# Patient Record
Sex: FEMALE | Race: WHITE | NOT HISPANIC OR LATINO
[De-identification: names, ages, dates, MRNs, and addresses within clinical notes are randomized per-mention and may not be internally consistent; named-entity substitution may affect disease eponyms.]

---

## 2017-02-08 ENCOUNTER — APPOINTMENT (OUTPATIENT)
Dept: RADIATION ONCOLOGY | Facility: CLINIC | Age: 82
End: 2017-02-08

## 2017-02-08 VITALS
BODY MASS INDEX: 26.94 KG/M2 | RESPIRATION RATE: 16 BRPM | OXYGEN SATURATION: 96 % | DIASTOLIC BLOOD PRESSURE: 74 MMHG | HEART RATE: 73 BPM | SYSTOLIC BLOOD PRESSURE: 119 MMHG | WEIGHT: 147.27 LBS

## 2017-02-08 DIAGNOSIS — Z00.00 ENCOUNTER FOR GENERAL ADULT MEDICAL EXAMINATION W/OUT ABNORMAL FINDINGS: ICD-10-CM

## 2017-08-10 ENCOUNTER — APPOINTMENT (OUTPATIENT)
Dept: RADIATION ONCOLOGY | Facility: CLINIC | Age: 82
End: 2017-08-10

## 2017-10-18 ENCOUNTER — APPOINTMENT (OUTPATIENT)
Dept: RADIATION ONCOLOGY | Facility: CLINIC | Age: 82
End: 2017-10-18
Payer: MEDICARE

## 2017-10-18 VITALS
WEIGHT: 158.01 LBS | OXYGEN SATURATION: 95 % | SYSTOLIC BLOOD PRESSURE: 130 MMHG | RESPIRATION RATE: 16 BRPM | DIASTOLIC BLOOD PRESSURE: 78 MMHG | BODY MASS INDEX: 28.9 KG/M2 | HEART RATE: 70 BPM

## 2017-10-18 DIAGNOSIS — G89.29 OTHER CHRONIC PAIN: ICD-10-CM

## 2017-10-18 PROCEDURE — 99213 OFFICE O/P EST LOW 20 MIN: CPT

## 2017-12-15 ENCOUNTER — APPOINTMENT (OUTPATIENT)
Dept: ORTHOPEDIC SURGERY | Facility: CLINIC | Age: 82
End: 2017-12-15

## 2018-06-26 ENCOUNTER — INPATIENT (INPATIENT)
Facility: HOSPITAL | Age: 83
LOS: 5 days | Discharge: HOME | DRG: 190 | End: 2018-07-02
Attending: HOSPITALIST | Admitting: HOSPITALIST
Payer: COMMERCIAL

## 2018-06-26 VITALS
RESPIRATION RATE: 20 BRPM | TEMPERATURE: 98 F | HEART RATE: 69 BPM | OXYGEN SATURATION: 89 % | DIASTOLIC BLOOD PRESSURE: 68 MMHG | SYSTOLIC BLOOD PRESSURE: 133 MMHG

## 2018-06-26 DIAGNOSIS — I44.7 LEFT BUNDLE-BRANCH BLOCK, UNSPECIFIED: ICD-10-CM

## 2018-06-26 DIAGNOSIS — Z29.9 ENCOUNTER FOR PROPHYLACTIC MEASURES, UNSPECIFIED: ICD-10-CM

## 2018-06-26 DIAGNOSIS — M54.5 LOW BACK PAIN: ICD-10-CM

## 2018-06-26 DIAGNOSIS — J44.1 CHRONIC OBSTRUCTIVE PULMONARY DISEASE WITH (ACUTE) EXACERBATION: ICD-10-CM

## 2018-06-26 DIAGNOSIS — I10 ESSENTIAL (PRIMARY) HYPERTENSION: ICD-10-CM

## 2018-06-26 DIAGNOSIS — R06.02 SHORTNESS OF BREATH: ICD-10-CM

## 2018-06-26 LAB
ALBUMIN SERPL ELPH-MCNC: 4.2 G/DL — SIGNIFICANT CHANGE UP (ref 3.3–5)
ALP SERPL-CCNC: 78 U/L — SIGNIFICANT CHANGE UP (ref 40–120)
ALT FLD-CCNC: 17 U/L — SIGNIFICANT CHANGE UP (ref 10–45)
ANION GAP SERPL CALC-SCNC: 15 MMOL/L — SIGNIFICANT CHANGE UP (ref 5–17)
APPEARANCE UR: CLEAR — SIGNIFICANT CHANGE UP
APTT BLD: 32.2 SEC — SIGNIFICANT CHANGE UP (ref 27.5–37.4)
AST SERPL-CCNC: 23 U/L — SIGNIFICANT CHANGE UP (ref 10–40)
BASOPHILS # BLD AUTO: 0 K/UL — SIGNIFICANT CHANGE UP (ref 0–0.2)
BILIRUB SERPL-MCNC: 0.4 MG/DL — SIGNIFICANT CHANGE UP (ref 0.2–1.2)
BILIRUB UR-MCNC: NEGATIVE — SIGNIFICANT CHANGE UP
BUN SERPL-MCNC: 15 MG/DL — SIGNIFICANT CHANGE UP (ref 7–23)
CALCIUM SERPL-MCNC: 9.9 MG/DL — SIGNIFICANT CHANGE UP (ref 8.4–10.5)
CHLORIDE SERPL-SCNC: 97 MMOL/L — SIGNIFICANT CHANGE UP (ref 96–108)
CO2 SERPL-SCNC: 24 MMOL/L — SIGNIFICANT CHANGE UP (ref 22–31)
COLOR SPEC: SIGNIFICANT CHANGE UP
CREAT SERPL-MCNC: 0.66 MG/DL — SIGNIFICANT CHANGE UP (ref 0.5–1.3)
DIFF PNL FLD: NEGATIVE — SIGNIFICANT CHANGE UP
EOSINOPHIL # BLD AUTO: 0 K/UL — SIGNIFICANT CHANGE UP (ref 0–0.5)
GLUCOSE SERPL-MCNC: 104 MG/DL — HIGH (ref 70–99)
GLUCOSE UR QL: NEGATIVE — SIGNIFICANT CHANGE UP
HCT VFR BLD CALC: 37.4 % — SIGNIFICANT CHANGE UP (ref 34.5–45)
HGB BLD-MCNC: 12.6 G/DL — SIGNIFICANT CHANGE UP (ref 11.5–15.5)
INR BLD: 1.04 RATIO — SIGNIFICANT CHANGE UP (ref 0.88–1.16)
KETONES UR-MCNC: NEGATIVE — SIGNIFICANT CHANGE UP
LEUKOCYTE ESTERASE UR-ACNC: NEGATIVE — SIGNIFICANT CHANGE UP
LYMPHOCYTES # BLD AUTO: 0.4 K/UL — LOW (ref 1–3.3)
MCHC RBC-ENTMCNC: 33.6 GM/DL — SIGNIFICANT CHANGE UP (ref 32–36)
MCHC RBC-ENTMCNC: 34 PG — SIGNIFICANT CHANGE UP (ref 27–34)
MCV RBC AUTO: 101 FL — HIGH (ref 80–100)
MONOCYTES # BLD AUTO: 0.8 K/UL — SIGNIFICANT CHANGE UP (ref 0–0.9)
MONOCYTES NFR BLD AUTO: 6 % — SIGNIFICANT CHANGE UP (ref 2–14)
NEUTROPHILS # BLD AUTO: 9.9 K/UL — HIGH (ref 1.8–7.4)
NEUTROPHILS NFR BLD AUTO: 90 % — HIGH (ref 43–77)
NITRITE UR-MCNC: NEGATIVE — SIGNIFICANT CHANGE UP
PH UR: 6.5 — SIGNIFICANT CHANGE UP (ref 5–8)
PLATELET # BLD AUTO: 291 K/UL — SIGNIFICANT CHANGE UP (ref 150–400)
POTASSIUM SERPL-MCNC: 3.5 MMOL/L — SIGNIFICANT CHANGE UP (ref 3.5–5.3)
POTASSIUM SERPL-SCNC: 3.5 MMOL/L — SIGNIFICANT CHANGE UP (ref 3.5–5.3)
PROT SERPL-MCNC: 7.1 G/DL — SIGNIFICANT CHANGE UP (ref 6–8.3)
PROT UR-MCNC: NEGATIVE — SIGNIFICANT CHANGE UP
PROTHROM AB SERPL-ACNC: 11.4 SEC — SIGNIFICANT CHANGE UP (ref 9.8–12.7)
RAPID RVP RESULT: DETECTED
RBC # BLD: 3.7 M/UL — LOW (ref 3.8–5.2)
RBC # FLD: 12.3 % — SIGNIFICANT CHANGE UP (ref 10.3–14.5)
RV+EV RNA SPEC QL NAA+PROBE: DETECTED
SODIUM SERPL-SCNC: 136 MMOL/L — SIGNIFICANT CHANGE UP (ref 135–145)
SP GR SPEC: 1.01 — LOW (ref 1.01–1.02)
TROPONIN T, HIGH SENSITIVITY RESULT: 16 NG/L — SIGNIFICANT CHANGE UP (ref 0–51)
TROPONIN T, HIGH SENSITIVITY RESULT: 22 NG/L — SIGNIFICANT CHANGE UP (ref 0–51)
UROBILINOGEN FLD QL: NEGATIVE — SIGNIFICANT CHANGE UP
WBC # BLD: 11.1 K/UL — HIGH (ref 3.8–10.5)
WBC # FLD AUTO: 11.1 K/UL — HIGH (ref 3.8–10.5)

## 2018-06-26 PROCEDURE — 71046 X-RAY EXAM CHEST 2 VIEWS: CPT | Mod: 26

## 2018-06-26 PROCEDURE — 93010 ELECTROCARDIOGRAM REPORT: CPT

## 2018-06-26 PROCEDURE — 99284 EMERGENCY DEPT VISIT MOD MDM: CPT | Mod: 25

## 2018-06-26 PROCEDURE — 71250 CT THORAX DX C-: CPT | Mod: 26

## 2018-06-26 RX ORDER — GABAPENTIN 400 MG/1
100 CAPSULE ORAL THREE TIMES A DAY
Qty: 0 | Refills: 0 | Status: DISCONTINUED | OUTPATIENT
Start: 2018-06-26 | End: 2018-07-02

## 2018-06-26 RX ORDER — BUDESONIDE AND FORMOTEROL FUMARATE DIHYDRATE 160; 4.5 UG/1; UG/1
2 AEROSOL RESPIRATORY (INHALATION)
Qty: 0 | Refills: 0 | Status: DISCONTINUED | OUTPATIENT
Start: 2018-06-26 | End: 2018-06-30

## 2018-06-26 RX ORDER — IPRATROPIUM/ALBUTEROL SULFATE 18-103MCG
3 AEROSOL WITH ADAPTER (GRAM) INHALATION ONCE
Qty: 0 | Refills: 0 | Status: COMPLETED | OUTPATIENT
Start: 2018-06-26 | End: 2018-06-26

## 2018-06-26 RX ORDER — IPRATROPIUM/ALBUTEROL SULFATE 18-103MCG
3 AEROSOL WITH ADAPTER (GRAM) INHALATION EVERY 6 HOURS
Qty: 0 | Refills: 0 | Status: DISCONTINUED | OUTPATIENT
Start: 2018-06-26 | End: 2018-07-02

## 2018-06-26 RX ORDER — OXYCODONE AND ACETAMINOPHEN 5; 325 MG/1; MG/1
1 TABLET ORAL THREE TIMES A DAY
Qty: 0 | Refills: 0 | Status: DISCONTINUED | OUTPATIENT
Start: 2018-06-26 | End: 2018-07-02

## 2018-06-26 RX ORDER — LORATADINE 10 MG/1
10 TABLET ORAL DAILY
Qty: 0 | Refills: 0 | Status: DISCONTINUED | OUTPATIENT
Start: 2018-06-26 | End: 2018-07-02

## 2018-06-26 RX ORDER — CITALOPRAM 10 MG/1
20 TABLET, FILM COATED ORAL DAILY
Qty: 0 | Refills: 0 | Status: DISCONTINUED | OUTPATIENT
Start: 2018-06-26 | End: 2018-07-02

## 2018-06-26 RX ORDER — DILTIAZEM HCL 120 MG
240 CAPSULE, EXT RELEASE 24 HR ORAL DAILY
Qty: 0 | Refills: 0 | Status: DISCONTINUED | OUTPATIENT
Start: 2018-06-26 | End: 2018-07-02

## 2018-06-26 RX ORDER — ENOXAPARIN SODIUM 100 MG/ML
40 INJECTION SUBCUTANEOUS EVERY 24 HOURS
Qty: 0 | Refills: 0 | Status: DISCONTINUED | OUTPATIENT
Start: 2018-06-26 | End: 2018-07-02

## 2018-06-26 RX ADMIN — Medication 3 MILLILITER(S): at 14:43

## 2018-06-26 RX ADMIN — OXYCODONE AND ACETAMINOPHEN 1 TABLET(S): 5; 325 TABLET ORAL at 23:33

## 2018-06-26 RX ADMIN — Medication 3 MILLILITER(S): at 23:32

## 2018-06-26 RX ADMIN — GABAPENTIN 100 MILLIGRAM(S): 400 CAPSULE ORAL at 22:01

## 2018-06-26 NOTE — H&P ADULT - NSHPSOCIALHISTORY_GEN_ALL_CORE
Approx 1PPD smoker for "forever" (at least 50 years), social ETOH use, no drug use. Lives alone, ambulates independently but not far due to back pain, manages her ADLs and almost all iADLs on her own.

## 2018-06-26 NOTE — ED PROVIDER NOTE - MEDICAL DECISION MAKING DETAILS
Attending MD Lange: 84 yo woman h/o COPD, CHF presenting with worsening shortness of breath for 1 week. Given a z-tristen for sinusitis on 6/15 for sinusitis.  Over last 5 days the SOB getting worse with orthopnea and SANTOS.  Attending MD Lange: A & O x 3, NAD, HEENT WNL and no facial asymmetry; lungs with left sided rhonchi, heart with reg rhythm without murmur; abdomen soft with mild epigastric TTP, NTND; extremities with no edema; skin with no rashes, neuro exam non focal with no motor or sensory deficits.  DDX bronchitis, viral URI, PNA, COPD exacerbation, CHF exacerbation.  Plan: labs, ekg, troponin, RVP, BNP, chest xray and re-eval.

## 2018-06-26 NOTE — H&P ADULT - PROBLEM SELECTOR PLAN 4
--hydrocodone not available. Will substitute with oxycodone.   --continue with home gabapentin. --hydrocodone not available. Will substitute with oxycodone.   --continue with home gabapentin.  --Istop  [id number]

## 2018-06-26 NOTE — ED ADULT NURSE NOTE - OBJECTIVE STATEMENT
84 y/o female presents to ed c/o nasal congestion, sob, body aches. Pt states she recently had a toothache and has been on antibiotics and began feeling worse. Denies chest pain, ha, n/v/d, abdominal pain, f/c, urinary symptoms, hematuria. A&Ox4, 89% RA, placed on 4 liters NC now 94%, skin warm dry and intact, MAEx4, lungs diminished, abd soft nondistended. EKG obtained, placed on cardiac monitor. Pt resting comfortably with VSS, no complaints at this time. Patient's bed in the lowest position, explained plan of care to patient and family members. Will continue to reassess.

## 2018-06-26 NOTE — ED ADULT NURSE NOTE - NS ED NURSE REPORT GIVEN TO FT
Report given to nurse Livier , 6 [address]. Understands pmh, medications given and plan of care for patient. Patient in stable condition, vital signs updated, has no complaints at this time and has been updated on care plan. Explained to patient that it is change of shift and new nurse is taking over, pt verbalized understanding.

## 2018-06-26 NOTE — H&P ADULT - PROBLEM SELECTOR PLAN 1
--known to have reactive airway disease, likely with underlying COPD given prolonged smoking history.   --Mild hypoxia in the ED (was 85% in the office today on RA). Currently on 4L NC with O2 sat about 95%.   --will treat currently with prednisone 40mg daily x 5 days, complete 5 day course of levaquin.  --b/l atelectasis on the CXR, poor quality. Will check CT chest non-con to assess fo underlying consolidation/bacterial PNA  --c/w bronchodilators and inhaled symbicort  --check ambulatory O2 sat in the morning to assess response.  --positive RVP.  --doubt cardiac etiology of SOB as no heart failure in 2013 TTE in the system and no overt CHF symptoms. proBNP can be elevated in elderly patients (hers in 1600). Doubt VTE/PE as her Wells criteria is zero. --known to have reactive airway disease, likely with underlying COPD given prolonged smoking history.   --Mild hypoxia in the ED (was 85% in the office today on RA). Currently on 4L NC with O2 sat about 95%.   --will treat currently with prednisone 40mg daily x 5 days. Would avoid levaquin as her QTc is 500 and substitute with augmentin. Would limit course to 5 days.   --b/l atelectasis on the CXR, poor quality. Will check CT chest non-con to assess fo underlying consolidation/bacterial PNA  --c/w bronchodilators and inhaled symbicort  --check ambulatory O2 sat in the morning to assess response.  --positive RVP.  --doubt cardiac etiology of SOB as no heart failure in 2013 TTE in the system and no overt CHF symptoms. proBNP can be elevated in elderly patients (hers in 1600). Doubt VTE/PE as her Wells criteria is zero.

## 2018-06-26 NOTE — H&P ADULT - HISTORY OF PRESENT ILLNESS
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 Patient had root canal performed June 14, was on a course of unknown antibiotics. 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 Patient had root canal performed June 14, was on a course of unknown antibiotics. Few days after procedure, developed fatigue, malaise, SOB, SANTOS, significant nasal congestion, cough, increased sputum production (not purulent). Was treated by her PMD for sinusitis with Zpack, completed sometime last week. Was instructed to resume symbicort and albuterol inhaler which she wasn't using much before. Had maybe slight improvement, but essentially symptoms continued. Has had trouble sleeping due to nasal congestion. Does not try to lay flat due to her low back pain. Has seen cardiology in the past for known LBBB. Patient and knowledgeable daughter at bedside deny history of heart failure or other cardiac problems. Has chronic LE swelling from b/l foot and ankle deformity which was never repaired. Amount to swelling does not change and is at baseline today. Received duoneb and levaquin in the ED.    Istop Reference #: [id number]

## 2018-06-26 NOTE — ED PROVIDER NOTE - OBJECTIVE STATEMENT
82 yo woman h/o COPD, CHF presenting with worsening shortness of breath for 1 week. [facility] 15 patient went to PCP for cold runny nose and cough and dx with sinusitis and given zpak. Patient 82 yo woman h/o COPD, CHF presenting with worsening shortness of breath for 1 week. [facility] 15 patient went to PCP for cold runny nose and cough and dx with sinusitis and given zpak. Patient has had worsening shortness of breath for 1 week with orthopnea and SANTOS. Patient has cough with clear sputum, clear runny rhinorrhea, and nasal congestion. no fevers chills, no abdominal pain, no dysuria. Patient complains of left chest irritation in the upper lateral quadrant. No sick contacts, no recent travel. patient received flu and PNA shot. PCP Dr. Hopper. 84 yo woman h/o COPD presenting with worsening shortness of breath for 1 week. [facility] 15 patient went to PCP for cold runny nose and cough and dx with sinusitis and given zpak. Patient has had worsening shortness of breath for 1 week with orthopnea and SANTOS. Patient has cough with clear sputum, clear runny rhinorrhea, and nasal congestion. no fevers chills, no abdominal pain, no dysuria. Patient complains of left chest irritation in the upper lateral quadrant. No sick contacts, no recent travel. patient received flu and PNA shot.     PMD Dr. Kandice Branham [phone]

## 2018-06-26 NOTE — ED ADULT NURSE NOTE - PSH
Herniated disc  Herniated disc repair  S/P appendectomy  65 years ago  S/P cataract surgery    S/P endometrial ablation    S/P ovarian cystectomy  47 years ago

## 2018-06-26 NOTE — ED ADULT NURSE NOTE - PMH
Cataract    Endometriosis    Glaucoma    Herniated disc    HTN (hypertension)    Left ventricular dysfunction    Osteoarthritis (arthritis due to wear and tear of joints)    Ovarian cyst

## 2018-06-26 NOTE — H&P ADULT - PMH
Cataract    COPD (chronic obstructive pulmonary disease)    Endometriosis    Glaucoma    Herniated disc    HTN (hypertension)    Osteoarthritis (arthritis due to wear and tear of joints)    Ovarian cyst

## 2018-06-26 NOTE — H&P ADULT - NSHPREVIEWOFSYSTEMS_GEN_ALL_CORE
Review of Systems:   CONSTITUTIONAL: +FATIGUE; No fever, weight loss  EYES: No eye pain, visual disturbances, or discharge  ENMT:  +PROFUSE NASAL DISCHARGE; No difficulty hearing, tinnitus, vertigo; No sinus or throat pain  NECK: No pain or stiffness  BREASTS: No pain, masses, or nipple discharge  RESPIRATORY: +COUGH, INCREASED SPUTUM, WHEEZING, SHORTNESS OF BREATH; No cough, wheezing, chills or hemoptysis; No shortness of breath  CARDIOVASCULAR: No chest pain, palpitations, dizziness, or leg swelling  GASTROINTESTINAL: No abdominal or epigastric pain. No nausea, vomiting, or hematemesis; No diarrhea or constipation. No melena or hematochezia.  GENITOURINARY: No dysuria, frequency, hematuria, or incontinence  NEUROLOGICAL: No headaches, memory loss, loss of strength, numbness, or tremors  SKIN: No itching, burning, rashes, or lesions   LYMPH NODES: No enlarged glands  ENDOCRINE: No heat or cold intolerance; No hair loss  MUSCULOSKELETAL: +CHRONIC BACK PAIN; No joint pain or swelling; No muscle, or extremity pain  PSYCHIATRIC: +DIFFICULTY SLEEPING; No depression, anxiety, mood swings  HEME/LYMPH: No easy bruising, or bleeding gums  ALLERY AND IMMUNOLOGIC: No hives or eczema  [X] all other systems negative or as per HPI

## 2018-06-26 NOTE — H&P ADULT - PROBLEM SELECTOR PLAN 2
--b/l atelectasis on the CXR, poor quality. Will check CT chest non-con to assess fo underlying consolidation/bacterial PNA. Mild leukocytosis present, but RVP is positive.

## 2018-06-26 NOTE — H&P ADULT - NSHPPHYSICALEXAM_GEN_ALL_CORE
Vital Signs Last 24 Hrs  T(C): 36.4 (06-26-18 @ 19:45)  T(F): 97.6 (06-26-18 @ 19:45), Max: 98.8 (06-26-18 @ 12:17)  HR: 59 (06-26-18 @ 19:45) (59 - 69)  BP: 122/65 (06-26-18 @ 19:45)  BP(mean): --  RR: 18 (06-26-18 @ 19:45) (18 - 20)  SpO2: 95% (06-26-18 @ 19:45) (89% - 95%)  Wt(kg): --    PHYSICAL EXAM:  GENERAL: NAD, well-developed  HEAD:  Atraumatic, Normocephalic  EYES: EOMI, PERRLA, conjunctiva and sclera clear  NECK: Supple, No JVD  CHEST/LUNG: Clear to auscultation bilaterally; No wheeze  HEART: Regular rate and rhythm; No murmurs, rubs, or gallops  ABDOMEN: Soft, Nontender, Nondistended; Bowel sounds present  EXTREMITIES:  2+ Peripheral Pulses, No clubbing, cyanosis, or edema  PSYCH: AAOx3  NEUROLOGY: non-focal  SKIN: No rashes or lesions Vital Signs Last 24 Hrs  T(C): 36.4 (06-26-18 @ 19:45)  T(F): 97.6 (06-26-18 @ 19:45), Max: 98.8 (06-26-18 @ 12:17)  HR: 59 (06-26-18 @ 19:45) (59 - 69)  BP: 122/65 (06-26-18 @ 19:45)  BP(mean): --  RR: 18 (06-26-18 @ 19:45) (18 - 20)  SpO2: 95% (06-26-18 @ 19:45) (89% - 95%)  Wt(kg): --    PHYSICAL EXAM:  GENERAL: NAD, well-developed  HEAD:  Atraumatic, Normocephalic. No sinus tenderness.   EYES: EOMI, PERRLA, conjunctiva and sclera clear  NECK: Supple, No JVD  CHEST/LUNG: Rhonchi bilateral lower lung fields, R worse than L; no wheezing currently. No crackles.   HEART: Regular rate and rhythm; No murmurs, rubs, or gallops  ABDOMEN: Soft, Nontender, Nondistended; Bowel sounds present  EXTREMITIES: Bilateral ankle deformities, 1-2+ non-pitting edema above ankles; 2+ Peripheral Pulses, No clubbing, cyanosis  PSYCH: AAOx3  NEUROLOGY: non-focal  SKIN: No rashes or lesions

## 2018-06-26 NOTE — ED PROVIDER NOTE - ATTENDING CONTRIBUTION TO CARE
Attending MD Lange:  I personally have seen and examined this patient.  Resident note reviewed and agree on plan of care and except where noted.  See MDM for details.

## 2018-06-26 NOTE — H&P ADULT - NSHPLABSRESULTS_GEN_ALL_CORE
EKG, labs, and imaging personally reviewed and interpreted by me.     EKG: NSR, PACs, LBBB, isolated TWI in AVL - per patient and daughter, CARLO is old and has been known for years, had surveillance with normal TTE and stress test about 1.5 years ago    LABS:             12.6   11.1  )-----------( 291      ( 2018 13:05 )             37.4     136  |  97  |  15  ----------------------------<  104<H>  3.5   |  24  |  0.66    Ca    9.9      2018 13:05    TPro  7.1  /  Alb  4.2  /  TBili  0.4  /  DBili  x   /  AST  23  /  ALT  17  /  AlkPhos  78  -    PT/INR - ( 2018 13:05 )   PT: 11.4 sec;   INR: 1.04 ratio    PTT - ( 2018 13:05 )  PTT:32.2 sec    Serum Pro-Brain Natriuretic Peptide (18 @ 13:05)    Serum Pro-Brain Natriuretic Peptide: 1611 pg/mL    Troponin T, High Sensitivity (18 @ 16:18)    Troponin T, High Sensitivity Result: 16: Rapid upward or downward changes in high-sensitivity troponin levels  suggest acute myocardial injury. Renal impairment may cause sustained  troponin elevations.  Normal: <6 - 14 ng/L  Indeterminate: 15-51 ng/L  Elevated: > 51 ng/L  See http://labs/test/TROPTHS on the [facility] intranet for more  information ng/L      Urinalysis Basic - ( 2018 15:14 )  Color: PL Yellow / Appearance: Clear / S.007 / pH: x  Gluc: x / Ketone: Negative  / Bili: Negative / Urobili: Negative   Blood: x / Protein: Negative / Nitrite: Negative   Leuk Esterase: Negative / RBC: x / WBC x   Sq Epi: x / Non Sq Epi: x / Bacteria: x    RADIOLOGY & ADDITIONAL TESTS:  Imaging Personally Reviewed:   CXR:   < from: Xray Chest 2 Views PA/Lat (18 @ 13:11) >  EXAM:  XR CHEST PA LAT 2V                        PROCEDURE DATE:  2018    INTERPRETATION:  A single chest x-ray was obtained on 2018.  Indication: Shortness of breath.    Impression:  The heart is normal in size. Bibasilar platelike atelectasis otherwise   the lungs appear to be clear. Orthopedic hardware is seen in the lumbar   spine.    Consultant(s) Notes Reviewed:  N/A  Care Discussed with Consultants/Other Providers: Nursing

## 2018-06-26 NOTE — H&P ADULT - ASSESSMENT
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 Likely with reactive airway disease/bronchitis from viral infection with possible mild COPD exacerbation. Would rule out PNA with further imaging.

## 2018-06-26 NOTE — ED ADULT NURSE NOTE - CHIEF COMPLAINT QUOTE
Pt has hx CHF, dx with sinusitis 1 week ago by MD Ibeth Hopper, started on Z-pack.  Pt went back to Dr. Hopper today for no improvement in sx. Pt now c/o worsening SANTOS, SOB.

## 2018-06-27 LAB
ANION GAP SERPL CALC-SCNC: 15 MMOL/L — SIGNIFICANT CHANGE UP (ref 5–17)
BUN SERPL-MCNC: 14 MG/DL — SIGNIFICANT CHANGE UP (ref 7–23)
CALCIUM SERPL-MCNC: 9.2 MG/DL — SIGNIFICANT CHANGE UP (ref 8.4–10.5)
CHLORIDE SERPL-SCNC: 99 MMOL/L — SIGNIFICANT CHANGE UP (ref 96–108)
CO2 SERPL-SCNC: 27 MMOL/L — SIGNIFICANT CHANGE UP (ref 22–31)
CREAT SERPL-MCNC: 0.71 MG/DL — SIGNIFICANT CHANGE UP (ref 0.5–1.3)
CULTURE RESULTS: SIGNIFICANT CHANGE UP
GLUCOSE SERPL-MCNC: 92 MG/DL — SIGNIFICANT CHANGE UP (ref 70–99)
HCT VFR BLD CALC: 32.4 % — LOW (ref 34.5–45)
HGB BLD-MCNC: 10.6 G/DL — LOW (ref 11.5–15.5)
MCHC RBC-ENTMCNC: 31.8 PG — SIGNIFICANT CHANGE UP (ref 27–34)
MCHC RBC-ENTMCNC: 32.7 GM/DL — SIGNIFICANT CHANGE UP (ref 32–36)
MCV RBC AUTO: 97.3 FL — SIGNIFICANT CHANGE UP (ref 80–100)
PLATELET # BLD AUTO: 233 K/UL — SIGNIFICANT CHANGE UP (ref 150–400)
POTASSIUM SERPL-MCNC: 3.6 MMOL/L — SIGNIFICANT CHANGE UP (ref 3.5–5.3)
POTASSIUM SERPL-SCNC: 3.6 MMOL/L — SIGNIFICANT CHANGE UP (ref 3.5–5.3)
RBC # BLD: 3.33 M/UL — LOW (ref 3.8–5.2)
RBC # FLD: 14.1 % — SIGNIFICANT CHANGE UP (ref 10.3–14.5)
SODIUM SERPL-SCNC: 141 MMOL/L — SIGNIFICANT CHANGE UP (ref 135–145)
SPECIMEN SOURCE: SIGNIFICANT CHANGE UP
WBC # BLD: 9.23 K/UL — SIGNIFICANT CHANGE UP (ref 3.8–10.5)
WBC # FLD AUTO: 9.23 K/UL — SIGNIFICANT CHANGE UP (ref 3.8–10.5)

## 2018-06-27 RX ADMIN — Medication 30 MILLIGRAM(S): at 21:07

## 2018-06-27 RX ADMIN — Medication 30 MILLIGRAM(S): at 12:32

## 2018-06-27 RX ADMIN — Medication 3 MILLILITER(S): at 05:26

## 2018-06-27 RX ADMIN — BUDESONIDE AND FORMOTEROL FUMARATE DIHYDRATE 2 PUFF(S): 160; 4.5 AEROSOL RESPIRATORY (INHALATION) at 19:52

## 2018-06-27 RX ADMIN — CITALOPRAM 20 MILLIGRAM(S): 10 TABLET, FILM COATED ORAL at 12:37

## 2018-06-27 RX ADMIN — OXYCODONE AND ACETAMINOPHEN 1 TABLET(S): 5; 325 TABLET ORAL at 00:15

## 2018-06-27 RX ADMIN — Medication 3 MILLILITER(S): at 19:53

## 2018-06-27 RX ADMIN — OXYCODONE AND ACETAMINOPHEN 1 TABLET(S): 5; 325 TABLET ORAL at 23:04

## 2018-06-27 RX ADMIN — ENOXAPARIN SODIUM 40 MILLIGRAM(S): 100 INJECTION SUBCUTANEOUS at 05:27

## 2018-06-27 RX ADMIN — Medication 1 TABLET(S): at 21:08

## 2018-06-27 RX ADMIN — LORATADINE 10 MILLIGRAM(S): 10 TABLET ORAL at 12:37

## 2018-06-27 RX ADMIN — Medication 1 TABLET(S): at 05:28

## 2018-06-27 RX ADMIN — GABAPENTIN 100 MILLIGRAM(S): 400 CAPSULE ORAL at 05:26

## 2018-06-27 RX ADMIN — OXYCODONE AND ACETAMINOPHEN 1 TABLET(S): 5; 325 TABLET ORAL at 23:54

## 2018-06-27 RX ADMIN — Medication 240 MILLIGRAM(S): at 05:26

## 2018-06-27 RX ADMIN — Medication 3 MILLILITER(S): at 23:04

## 2018-06-27 RX ADMIN — Medication 3 MILLILITER(S): at 12:36

## 2018-06-27 RX ADMIN — BUDESONIDE AND FORMOTEROL FUMARATE DIHYDRATE 2 PUFF(S): 160; 4.5 AEROSOL RESPIRATORY (INHALATION) at 05:27

## 2018-06-27 RX ADMIN — Medication 40 MILLIGRAM(S): at 05:27

## 2018-06-27 RX ADMIN — GABAPENTIN 100 MILLIGRAM(S): 400 CAPSULE ORAL at 21:08

## 2018-06-27 NOTE — CONSULT NOTE ADULT - SUBJECTIVE AND OBJECTIVE BOX
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 Patient had root canal performed , was on a course of unknown antibiotics. Few days after procedure, developed fatigue, malaise, SOB, SANTOS, significant nasal congestion, cough, increased sputum production (not purulent). Was treated by her PMD for sinusitis with Zpack, completed sometime last week. Was instructed to resume symbicort and albuterol inhaler which she wasn't using much before. Had maybe slight improvement, but essentially symptoms continued. Has had trouble sleeping due to nasal congestion. Does not try to lay flat due to her low back pain. Has seen cardiology in the past for known LBBB. Patient and knowledgeable daughter at bedside deny history of heart failure or other cardiac problems. Has chronic LE swelling from b/l foot and ankle deformity which was never repaired. Amount to swelling does not change and is at baseline today. Received duoneb and levaquin in the ED.    Patient is active 1 ppd smoker: does not use inhalers on regular basis. C/O wheezing/dyspnea seasonally and associated with URI. Does not use home O2, and is not acively followed by pulmonologist    I  PAST MEDICAL & SURGICAL HISTORY:  COPD (chronic obstructive pulmonary disease)  Ovarian cyst  Endometriosis  Osteoarthritis (arthritis due to wear and tear of joints)  Herniated disc  Cataract  Glaucoma  HTN (hypertension)  S/P endometrial ablation  Herniated disc: Herniated disc repair  S/P ovarian cystectomy: 47 years ago  S/P appendectomy: 65 years ago  S/P cataract surgery    Allergies    No Known Allergies    Intolerances      FAMILY HISTORY:  No pertinent family history in first degree relatives    Social history: Active smoker    Review of Systems: Review of Systems:   CONSTITUTIONAL: +FATIGUE; No fever, weight loss  EYES: No eye pain, visual disturbances, or discharge  ENMT:  +PROFUSE NASAL DISCHARGE; No difficulty hearing, tinnitus, vertigo; No sinus or throat pain  NECK: No pain or stiffness  BREASTS: No pain, masses, or nipple discharge  RESPIRATORY: +COUGH, INCREASED SPUTUM, WHEEZING, SHORTNESS OF BREATH; No cough, wheezing, chills or hemoptysis; No shortness of breath  CARDIOVASCULAR: No chest pain, palpitations, dizziness, or leg swelling  GASTROINTESTINAL: No abdominal or epigastric pain. No nausea, vomiting, or hematemesis; No diarrhea or constipation. No melena or hematochezia.  GENITOURINARY: No dysuria, frequency, hematuria, or incontinence  NEUROLOGICAL: No headaches, memory loss, loss of strength, numbness, or tremors  SKIN: No itching, burning, rashes, or lesions   LYMPH NODES: No enlarged glands  ENDOCRINE: No heat or cold intolerance; No hair loss  MUSCULOSKELETAL: +CHRONIC BACK PAIN; No joint pain or swelling; No muscle, or extremity pain  PSYCHIATRIC: +DIFFICULTY SLEEPING; No depression, anxiety, mood swings  HEME/LYMPH: No easy bruising, or bleeding gums  ALLERY AND IMMUNOLOGIC: No hives or eczema [X] all other systems negative or as per HPI	  Other Review of Systems: All other review of systems negative, except as noted in HPI	      Allergies and Intolerances:        Allergies:  	No Known Allergies:       Medications:  MEDICATIONS  (STANDING):  ALBUTerol/ipratropium for Nebulization 3 milliLiter(s) Nebulizer every 6 hours  amoxicillin  500 milliGRAM(s)/clavulanate 1 Tablet(s) Oral every 8 hours  buDESOnide 160 MICROgram(s)/formoterol 4.5 MICROgram(s) Inhaler 2 Puff(s) Inhalation two times a day  citalopram 20 milliGRAM(s) Oral daily  diltiazem    milliGRAM(s) Oral daily  enoxaparin Injectable 40 milliGRAM(s) SubCutaneous every 24 hours  gabapentin 100 milliGRAM(s) Oral three times a day  loratadine 10 milliGRAM(s) Oral daily  predniSONE   Tablet 40 milliGRAM(s) Oral daily    MEDICATIONS  (PRN):  oxyCODONE    5 mG/acetaminophen 325 mG 1 Tablet(s) Oral three times a day PRN mild and moderate pain    Vital Signs Last 24 Hrs  T(C): 36.9 (2018 09:39), Max: 37.1 (2018 12:17)  T(F): 98.5 (2018 09:39), Max: 98.8 (2018 12:17)  HR: 70 (2018 09:39) (59 - 70)  BP: 105/56 (2018 09:39) (105/56 - 137/61)  BP(mean): --  RR: 18 (2018 09:39) (18 - 20)  SpO2: 94% (2018 09:39) (89% - 95%)           @ 07:01  -   @ 07:00  --------------------------------------------------------  IN: 0 mL / OUT: 0 mL / NET: 0 mL      LABS:                        10.6   9.23  )-----------( 233      ( 2018 08:14 )             32.4         141  |  99  |  14  ----------------------------<  92  3.6   |  27  |  0.71    Ca    9.2      2018 06:02    TPro  7.1  /  Alb  4.2  /  TBili  0.4  /  DBili  x   /  AST  23  /  ALT  17  /  AlkPhos  78        PT/INR - ( 2018 13:05 )   PT: 11.4 sec;   INR: 1.04 ratio         PTT - ( 2018 13:05 )  PTT:32.2 sec  Urinalysis Basic - ( 2018 15:14 )    Color: PL Yellow / Appearance: Clear / S.007 / pH: x  Gluc: x / Ketone: Negative  / Bili: Negative / Urobili: Negative   Blood: x / Protein: Negative / Nitrite: Negative   Leuk Esterase: Negative / RBC: x / WBC x   Sq Epi: x / Non Sq Epi: x / Bacteria: x      Procalcitonin, Serum: 0.09 ng/mL (18 @ 06:02)    Serum Pro-Brain Natriuretic Peptide: 1611 pg/mL (18 @ 13:05)      CULTURES:        Physical Examination:    General: Non toxic, No acute distress.      HEENT: Pupils equal, reactive to light.  Symmetric.    PULM: Bilateral exp wheeze and rhonchi    CVS: Regular rate and rhythm, no murmurs, rubs, or gallops    ABD: Soft, nondistended, nontender, normoactive bowel sounds, no masses    EXT: No edema, nontender    SKIN: Warm and well perfused, no rashes noted.    NEURO: Alert, oriented, interactive, nonfocal    RADIOLOGY REVIEWED PERSONALLY  CXR:    CT chest: Patchy ground glass and nodular opacities: R>>L basilar predominant with scattered tree in bud    TTE: NA

## 2018-06-28 LAB
ANION GAP SERPL CALC-SCNC: 13 MMOL/L — SIGNIFICANT CHANGE UP (ref 5–17)
BUN SERPL-MCNC: 12 MG/DL — SIGNIFICANT CHANGE UP (ref 7–23)
CALCIUM SERPL-MCNC: 9.7 MG/DL — SIGNIFICANT CHANGE UP (ref 8.4–10.5)
CHLORIDE SERPL-SCNC: 100 MMOL/L — SIGNIFICANT CHANGE UP (ref 96–108)
CO2 SERPL-SCNC: 28 MMOL/L — SIGNIFICANT CHANGE UP (ref 22–31)
CREAT SERPL-MCNC: 0.52 MG/DL — SIGNIFICANT CHANGE UP (ref 0.5–1.3)
GLUCOSE SERPL-MCNC: 147 MG/DL — HIGH (ref 70–99)
HCT VFR BLD CALC: 35.6 % — SIGNIFICANT CHANGE UP (ref 34.5–45)
HGB BLD-MCNC: 11.5 G/DL — SIGNIFICANT CHANGE UP (ref 11.5–15.5)
MCHC RBC-ENTMCNC: 31.9 PG — SIGNIFICANT CHANGE UP (ref 27–34)
MCHC RBC-ENTMCNC: 32.3 GM/DL — SIGNIFICANT CHANGE UP (ref 32–36)
MCV RBC AUTO: 98.9 FL — SIGNIFICANT CHANGE UP (ref 80–100)
PLATELET # BLD AUTO: 283 K/UL — SIGNIFICANT CHANGE UP (ref 150–400)
POTASSIUM SERPL-MCNC: 3.5 MMOL/L — SIGNIFICANT CHANGE UP (ref 3.5–5.3)
POTASSIUM SERPL-SCNC: 3.5 MMOL/L — SIGNIFICANT CHANGE UP (ref 3.5–5.3)
RBC # BLD: 3.6 M/UL — LOW (ref 3.8–5.2)
RBC # FLD: 13.6 % — SIGNIFICANT CHANGE UP (ref 10.3–14.5)
SODIUM SERPL-SCNC: 141 MMOL/L — SIGNIFICANT CHANGE UP (ref 135–145)
WBC # BLD: 10.23 K/UL — SIGNIFICANT CHANGE UP (ref 3.8–10.5)
WBC # FLD AUTO: 10.23 K/UL — SIGNIFICANT CHANGE UP (ref 3.8–10.5)

## 2018-06-28 RX ADMIN — BUDESONIDE AND FORMOTEROL FUMARATE DIHYDRATE 2 PUFF(S): 160; 4.5 AEROSOL RESPIRATORY (INHALATION) at 17:33

## 2018-06-28 RX ADMIN — Medication 3 MILLILITER(S): at 17:33

## 2018-06-28 RX ADMIN — Medication 1 TABLET(S): at 13:03

## 2018-06-28 RX ADMIN — GABAPENTIN 100 MILLIGRAM(S): 400 CAPSULE ORAL at 21:15

## 2018-06-28 RX ADMIN — GABAPENTIN 100 MILLIGRAM(S): 400 CAPSULE ORAL at 13:03

## 2018-06-28 RX ADMIN — GABAPENTIN 100 MILLIGRAM(S): 400 CAPSULE ORAL at 05:46

## 2018-06-28 RX ADMIN — Medication 240 MILLIGRAM(S): at 05:46

## 2018-06-28 RX ADMIN — Medication 30 MILLIGRAM(S): at 21:14

## 2018-06-28 RX ADMIN — Medication 3 MILLILITER(S): at 05:46

## 2018-06-28 RX ADMIN — Medication 30 MILLIGRAM(S): at 05:45

## 2018-06-28 RX ADMIN — ENOXAPARIN SODIUM 40 MILLIGRAM(S): 100 INJECTION SUBCUTANEOUS at 05:45

## 2018-06-28 RX ADMIN — Medication 3 MILLILITER(S): at 23:22

## 2018-06-28 RX ADMIN — Medication 1 TABLET(S): at 21:15

## 2018-06-28 RX ADMIN — Medication 3 MILLILITER(S): at 11:15

## 2018-06-28 RX ADMIN — BUDESONIDE AND FORMOTEROL FUMARATE DIHYDRATE 2 PUFF(S): 160; 4.5 AEROSOL RESPIRATORY (INHALATION) at 05:46

## 2018-06-28 RX ADMIN — CITALOPRAM 20 MILLIGRAM(S): 10 TABLET, FILM COATED ORAL at 11:16

## 2018-06-28 RX ADMIN — Medication 1 TABLET(S): at 05:45

## 2018-06-28 RX ADMIN — LORATADINE 10 MILLIGRAM(S): 10 TABLET ORAL at 11:16

## 2018-06-28 RX ADMIN — Medication 30 MILLIGRAM(S): at 13:03

## 2018-06-28 NOTE — PROVIDER CONTACT NOTE (OTHER) - ASSESSMENT
Pt A&Ox4, VSS. Pt denies chest pain, pressure, or palpitations. Pt denies lightheadedness/dizziness. Pt stable at this time resting in bed.

## 2018-06-29 LAB
ANION GAP SERPL CALC-SCNC: 12 MMOL/L — SIGNIFICANT CHANGE UP (ref 5–17)
BUN SERPL-MCNC: 21 MG/DL — SIGNIFICANT CHANGE UP (ref 7–23)
CALCIUM SERPL-MCNC: 9.4 MG/DL — SIGNIFICANT CHANGE UP (ref 8.4–10.5)
CHLORIDE SERPL-SCNC: 98 MMOL/L — SIGNIFICANT CHANGE UP (ref 96–108)
CO2 SERPL-SCNC: 31 MMOL/L — SIGNIFICANT CHANGE UP (ref 22–31)
CREAT SERPL-MCNC: 0.66 MG/DL — SIGNIFICANT CHANGE UP (ref 0.5–1.3)
GLUCOSE SERPL-MCNC: 139 MG/DL — HIGH (ref 70–99)
HCT VFR BLD CALC: 34.4 % — LOW (ref 34.5–45)
HGB BLD-MCNC: 11.1 G/DL — LOW (ref 11.5–15.5)
LEGIONELLA AG UR QL: NEGATIVE — SIGNIFICANT CHANGE UP
MCHC RBC-ENTMCNC: 31.5 PG — SIGNIFICANT CHANGE UP (ref 27–34)
MCHC RBC-ENTMCNC: 32.3 GM/DL — SIGNIFICANT CHANGE UP (ref 32–36)
MCV RBC AUTO: 97.7 FL — SIGNIFICANT CHANGE UP (ref 80–100)
PLATELET # BLD AUTO: 299 K/UL — SIGNIFICANT CHANGE UP (ref 150–400)
POTASSIUM SERPL-MCNC: 3.9 MMOL/L — SIGNIFICANT CHANGE UP (ref 3.5–5.3)
POTASSIUM SERPL-SCNC: 3.9 MMOL/L — SIGNIFICANT CHANGE UP (ref 3.5–5.3)
RBC # BLD: 3.52 M/UL — LOW (ref 3.8–5.2)
RBC # FLD: 14.1 % — SIGNIFICANT CHANGE UP (ref 10.3–14.5)
SODIUM SERPL-SCNC: 141 MMOL/L — SIGNIFICANT CHANGE UP (ref 135–145)
WBC # BLD: 12.94 K/UL — HIGH (ref 3.8–10.5)
WBC # FLD AUTO: 12.94 K/UL — HIGH (ref 3.8–10.5)

## 2018-06-29 RX ORDER — SENNA PLUS 8.6 MG/1
2 TABLET ORAL AT BEDTIME
Qty: 0 | Refills: 0 | Status: DISCONTINUED | OUTPATIENT
Start: 2018-06-29 | End: 2018-07-02

## 2018-06-29 RX ORDER — DOCUSATE SODIUM 100 MG
100 CAPSULE ORAL THREE TIMES A DAY
Qty: 0 | Refills: 0 | Status: DISCONTINUED | OUTPATIENT
Start: 2018-06-29 | End: 2018-07-02

## 2018-06-29 RX ORDER — ZOLPIDEM TARTRATE 10 MG/1
5 TABLET ORAL ONCE
Qty: 0 | Refills: 0 | Status: DISCONTINUED | OUTPATIENT
Start: 2018-06-29 | End: 2018-06-29

## 2018-06-29 RX ORDER — POLYETHYLENE GLYCOL 3350 17 G/17G
17 POWDER, FOR SOLUTION ORAL ONCE
Qty: 0 | Refills: 0 | Status: COMPLETED | OUTPATIENT
Start: 2018-06-29 | End: 2018-06-29

## 2018-06-29 RX ADMIN — Medication 1 TABLET(S): at 21:35

## 2018-06-29 RX ADMIN — GABAPENTIN 100 MILLIGRAM(S): 400 CAPSULE ORAL at 05:10

## 2018-06-29 RX ADMIN — Medication 100 MILLIGRAM(S): at 13:38

## 2018-06-29 RX ADMIN — Medication 30 MILLIGRAM(S): at 05:10

## 2018-06-29 RX ADMIN — Medication 1 TABLET(S): at 05:10

## 2018-06-29 RX ADMIN — Medication 3 MILLILITER(S): at 05:10

## 2018-06-29 RX ADMIN — Medication 3 MILLILITER(S): at 11:11

## 2018-06-29 RX ADMIN — LORATADINE 10 MILLIGRAM(S): 10 TABLET ORAL at 11:11

## 2018-06-29 RX ADMIN — BUDESONIDE AND FORMOTEROL FUMARATE DIHYDRATE 2 PUFF(S): 160; 4.5 AEROSOL RESPIRATORY (INHALATION) at 05:11

## 2018-06-29 RX ADMIN — POLYETHYLENE GLYCOL 3350 17 GRAM(S): 17 POWDER, FOR SOLUTION ORAL at 13:38

## 2018-06-29 RX ADMIN — Medication 240 MILLIGRAM(S): at 05:10

## 2018-06-29 RX ADMIN — GABAPENTIN 100 MILLIGRAM(S): 400 CAPSULE ORAL at 13:38

## 2018-06-29 RX ADMIN — SENNA PLUS 2 TABLET(S): 8.6 TABLET ORAL at 21:35

## 2018-06-29 RX ADMIN — Medication 1 TABLET(S): at 13:38

## 2018-06-29 RX ADMIN — Medication 30 MILLIGRAM(S): at 13:38

## 2018-06-29 RX ADMIN — BUDESONIDE AND FORMOTEROL FUMARATE DIHYDRATE 2 PUFF(S): 160; 4.5 AEROSOL RESPIRATORY (INHALATION) at 17:30

## 2018-06-29 RX ADMIN — Medication 3 MILLILITER(S): at 23:11

## 2018-06-29 RX ADMIN — ENOXAPARIN SODIUM 40 MILLIGRAM(S): 100 INJECTION SUBCUTANEOUS at 05:10

## 2018-06-29 RX ADMIN — Medication 3 MILLILITER(S): at 17:30

## 2018-06-29 RX ADMIN — Medication 100 MILLIGRAM(S): at 21:35

## 2018-06-29 RX ADMIN — Medication 30 MILLIGRAM(S): at 21:35

## 2018-06-29 RX ADMIN — ZOLPIDEM TARTRATE 5 MILLIGRAM(S): 10 TABLET ORAL at 23:11

## 2018-06-29 RX ADMIN — CITALOPRAM 20 MILLIGRAM(S): 10 TABLET, FILM COATED ORAL at 11:11

## 2018-06-29 RX ADMIN — GABAPENTIN 100 MILLIGRAM(S): 400 CAPSULE ORAL at 21:34

## 2018-06-29 RX ADMIN — OXYCODONE AND ACETAMINOPHEN 1 TABLET(S): 5; 325 TABLET ORAL at 15:00

## 2018-06-29 NOTE — PHYSICAL THERAPY INITIAL EVALUATION ADULT - PERTINENT HX OF CURRENT PROBLEM, REHAB EVAL
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  pt's course also complicated by multiplobular PNA, COPD exacerbation and bronchospasm

## 2018-06-30 LAB
ANION GAP SERPL CALC-SCNC: 14 MMOL/L — SIGNIFICANT CHANGE UP (ref 5–17)
BUN SERPL-MCNC: 21 MG/DL — SIGNIFICANT CHANGE UP (ref 7–23)
CALCIUM SERPL-MCNC: 9.3 MG/DL — SIGNIFICANT CHANGE UP (ref 8.4–10.5)
CHLORIDE SERPL-SCNC: 99 MMOL/L — SIGNIFICANT CHANGE UP (ref 96–108)
CO2 SERPL-SCNC: 29 MMOL/L — SIGNIFICANT CHANGE UP (ref 22–31)
CREAT SERPL-MCNC: 0.63 MG/DL — SIGNIFICANT CHANGE UP (ref 0.5–1.3)
GLUCOSE SERPL-MCNC: 133 MG/DL — HIGH (ref 70–99)
HCT VFR BLD CALC: 37.1 % — SIGNIFICANT CHANGE UP (ref 34.5–45)
HGB BLD-MCNC: 11.9 G/DL — SIGNIFICANT CHANGE UP (ref 11.5–15.5)
MCHC RBC-ENTMCNC: 31.8 PG — SIGNIFICANT CHANGE UP (ref 27–34)
MCHC RBC-ENTMCNC: 32.1 GM/DL — SIGNIFICANT CHANGE UP (ref 32–36)
MCV RBC AUTO: 99.2 FL — SIGNIFICANT CHANGE UP (ref 80–100)
PLATELET # BLD AUTO: 316 K/UL — SIGNIFICANT CHANGE UP (ref 150–400)
POTASSIUM SERPL-MCNC: 4.1 MMOL/L — SIGNIFICANT CHANGE UP (ref 3.5–5.3)
POTASSIUM SERPL-SCNC: 4.1 MMOL/L — SIGNIFICANT CHANGE UP (ref 3.5–5.3)
RBC # BLD: 3.74 M/UL — LOW (ref 3.8–5.2)
RBC # FLD: 14 % — SIGNIFICANT CHANGE UP (ref 10.3–14.5)
SODIUM SERPL-SCNC: 142 MMOL/L — SIGNIFICANT CHANGE UP (ref 135–145)
WBC # BLD: 16.01 K/UL — HIGH (ref 3.8–10.5)
WBC # FLD AUTO: 16.01 K/UL — HIGH (ref 3.8–10.5)

## 2018-06-30 RX ORDER — BUDESONIDE AND FORMOTEROL FUMARATE DIHYDRATE 160; 4.5 UG/1; UG/1
2 AEROSOL RESPIRATORY (INHALATION)
Qty: 0 | Refills: 0 | Status: DISCONTINUED | OUTPATIENT
Start: 2018-06-30 | End: 2018-07-02

## 2018-06-30 RX ORDER — TIOTROPIUM BROMIDE 18 UG/1
1 CAPSULE ORAL; RESPIRATORY (INHALATION) DAILY
Qty: 0 | Refills: 0 | Status: DISCONTINUED | OUTPATIENT
Start: 2018-06-30 | End: 2018-07-02

## 2018-06-30 RX ORDER — ZOLPIDEM TARTRATE 10 MG/1
5 TABLET ORAL ONCE
Qty: 0 | Refills: 0 | Status: DISCONTINUED | OUTPATIENT
Start: 2018-06-30 | End: 2018-06-30

## 2018-06-30 RX ADMIN — OXYCODONE AND ACETAMINOPHEN 1 TABLET(S): 5; 325 TABLET ORAL at 15:00

## 2018-06-30 RX ADMIN — OXYCODONE AND ACETAMINOPHEN 1 TABLET(S): 5; 325 TABLET ORAL at 14:42

## 2018-06-30 RX ADMIN — Medication 100 MILLIGRAM(S): at 05:39

## 2018-06-30 RX ADMIN — ENOXAPARIN SODIUM 40 MILLIGRAM(S): 100 INJECTION SUBCUTANEOUS at 05:39

## 2018-06-30 RX ADMIN — Medication 240 MILLIGRAM(S): at 05:38

## 2018-06-30 RX ADMIN — Medication 3 MILLILITER(S): at 17:26

## 2018-06-30 RX ADMIN — Medication 1 TABLET(S): at 05:38

## 2018-06-30 RX ADMIN — GABAPENTIN 100 MILLIGRAM(S): 400 CAPSULE ORAL at 13:53

## 2018-06-30 RX ADMIN — GABAPENTIN 100 MILLIGRAM(S): 400 CAPSULE ORAL at 05:39

## 2018-06-30 RX ADMIN — CITALOPRAM 20 MILLIGRAM(S): 10 TABLET, FILM COATED ORAL at 11:12

## 2018-06-30 RX ADMIN — GABAPENTIN 100 MILLIGRAM(S): 400 CAPSULE ORAL at 21:45

## 2018-06-30 RX ADMIN — Medication 30 MILLIGRAM(S): at 05:39

## 2018-06-30 RX ADMIN — LORATADINE 10 MILLIGRAM(S): 10 TABLET ORAL at 11:12

## 2018-06-30 RX ADMIN — Medication 3 MILLILITER(S): at 11:12

## 2018-06-30 RX ADMIN — Medication 3 MILLILITER(S): at 05:39

## 2018-06-30 RX ADMIN — Medication 3 MILLILITER(S): at 23:47

## 2018-06-30 RX ADMIN — BUDESONIDE AND FORMOTEROL FUMARATE DIHYDRATE 2 PUFF(S): 160; 4.5 AEROSOL RESPIRATORY (INHALATION) at 17:27

## 2018-06-30 RX ADMIN — Medication 1 TABLET(S): at 22:39

## 2018-06-30 RX ADMIN — Medication 30 MILLIGRAM(S): at 17:27

## 2018-06-30 RX ADMIN — BUDESONIDE AND FORMOTEROL FUMARATE DIHYDRATE 2 PUFF(S): 160; 4.5 AEROSOL RESPIRATORY (INHALATION) at 05:39

## 2018-06-30 RX ADMIN — Medication 200 MILLIGRAM(S): at 17:26

## 2018-06-30 RX ADMIN — TIOTROPIUM BROMIDE 1 CAPSULE(S): 18 CAPSULE ORAL; RESPIRATORY (INHALATION) at 17:27

## 2018-06-30 RX ADMIN — ZOLPIDEM TARTRATE 5 MILLIGRAM(S): 10 TABLET ORAL at 22:39

## 2018-06-30 RX ADMIN — Medication 1 TABLET(S): at 16:21

## 2018-07-01 ENCOUNTER — TRANSCRIPTION ENCOUNTER (OUTPATIENT)
Age: 83
End: 2018-07-01

## 2018-07-01 RX ORDER — LORATADINE 10 MG/1
1 TABLET ORAL
Qty: 0 | Refills: 0 | DISCHARGE
Start: 2018-07-01

## 2018-07-01 RX ORDER — ZOLPIDEM TARTRATE 10 MG/1
5 TABLET ORAL ONCE
Qty: 0 | Refills: 0 | Status: DISCONTINUED | OUTPATIENT
Start: 2018-07-01 | End: 2018-07-01

## 2018-07-01 RX ORDER — LORATADINE 10 MG/1
1 TABLET ORAL
Qty: 0 | Refills: 0 | COMMUNITY

## 2018-07-01 RX ORDER — DILTIAZEM HCL 120 MG
1 CAPSULE, EXT RELEASE 24 HR ORAL
Qty: 0 | Refills: 0 | COMMUNITY

## 2018-07-01 RX ORDER — DILTIAZEM HCL 120 MG
1 CAPSULE, EXT RELEASE 24 HR ORAL
Qty: 0 | Refills: 0 | DISCHARGE
Start: 2018-07-01

## 2018-07-01 RX ORDER — GABAPENTIN 400 MG/1
1 CAPSULE ORAL
Qty: 0 | Refills: 0 | DISCHARGE
Start: 2018-07-01

## 2018-07-01 RX ORDER — CITALOPRAM 10 MG/1
1 TABLET, FILM COATED ORAL
Qty: 0 | Refills: 0 | COMMUNITY

## 2018-07-01 RX ORDER — CITALOPRAM 10 MG/1
1 TABLET, FILM COATED ORAL
Qty: 0 | Refills: 0 | DISCHARGE
Start: 2018-07-01

## 2018-07-01 RX ORDER — GABAPENTIN 400 MG/1
1 CAPSULE ORAL
Qty: 0 | Refills: 0 | COMMUNITY

## 2018-07-01 RX ADMIN — BUDESONIDE AND FORMOTEROL FUMARATE DIHYDRATE 2 PUFF(S): 160; 4.5 AEROSOL RESPIRATORY (INHALATION) at 05:36

## 2018-07-01 RX ADMIN — ENOXAPARIN SODIUM 40 MILLIGRAM(S): 100 INJECTION SUBCUTANEOUS at 05:34

## 2018-07-01 RX ADMIN — Medication 100 MILLIGRAM(S): at 22:39

## 2018-07-01 RX ADMIN — Medication 30 MILLIGRAM(S): at 17:02

## 2018-07-01 RX ADMIN — Medication 30 MILLIGRAM(S): at 05:35

## 2018-07-01 RX ADMIN — Medication 100 MILLIGRAM(S): at 05:36

## 2018-07-01 RX ADMIN — Medication 240 MILLIGRAM(S): at 05:34

## 2018-07-01 RX ADMIN — LORATADINE 10 MILLIGRAM(S): 10 TABLET ORAL at 12:14

## 2018-07-01 RX ADMIN — TIOTROPIUM BROMIDE 1 CAPSULE(S): 18 CAPSULE ORAL; RESPIRATORY (INHALATION) at 12:14

## 2018-07-01 RX ADMIN — Medication 3 MILLILITER(S): at 17:02

## 2018-07-01 RX ADMIN — Medication 3 MILLILITER(S): at 12:14

## 2018-07-01 RX ADMIN — CITALOPRAM 20 MILLIGRAM(S): 10 TABLET, FILM COATED ORAL at 14:33

## 2018-07-01 RX ADMIN — Medication 3 MILLILITER(S): at 23:15

## 2018-07-01 RX ADMIN — OXYCODONE AND ACETAMINOPHEN 1 TABLET(S): 5; 325 TABLET ORAL at 00:00

## 2018-07-01 RX ADMIN — ZOLPIDEM TARTRATE 5 MILLIGRAM(S): 10 TABLET ORAL at 22:39

## 2018-07-01 RX ADMIN — GABAPENTIN 100 MILLIGRAM(S): 400 CAPSULE ORAL at 14:34

## 2018-07-01 RX ADMIN — GABAPENTIN 100 MILLIGRAM(S): 400 CAPSULE ORAL at 05:35

## 2018-07-01 RX ADMIN — GABAPENTIN 100 MILLIGRAM(S): 400 CAPSULE ORAL at 22:39

## 2018-07-01 RX ADMIN — BUDESONIDE AND FORMOTEROL FUMARATE DIHYDRATE 2 PUFF(S): 160; 4.5 AEROSOL RESPIRATORY (INHALATION) at 17:02

## 2018-07-01 RX ADMIN — Medication 3 MILLILITER(S): at 05:34

## 2018-07-01 RX ADMIN — OXYCODONE AND ACETAMINOPHEN 1 TABLET(S): 5; 325 TABLET ORAL at 17:02

## 2018-07-01 NOTE — PROGRESS NOTE ADULT - PROBLEM SELECTOR PLAN 5
--well known history, on EKG in 2013. No further work up at this time.

## 2018-07-01 NOTE — DISCHARGE NOTE ADULT - MEDICATION SUMMARY - MEDICATIONS TO TAKE
I will START or STAY ON the medications listed below when I get home from the hospital:    predniSONE 10 mg oral tablet  -- 2 tab(s) by mouth  BID x 2 days then 3 tabs by mouth x 2 days then 2 tabs orally, x 2 days then 1 tab by mouth x 2 days then stop  -- It is very important that you take or use this exactly as directed.  Do not skip doses or discontinue unless directed by your doctor.  Obtain medical advice before taking any non-prescription drugs as some may affect the action of this medication.  Take with food or milk.    -- Indication: For COPD (chronic obstructive pulmonary disease)    HYDROcodone-acetaminophen 7.5 mg-325 mg oral tablet  -- 1 tab(s) by mouth 3 times a day, As Needed  -- Indication: For COugh    buprenorphine 20 mcg/hr transdermal film, extended release  -- 1 patch by transdermal patch once a week every Saturday  -- Indication: For COPD (chronic obstructive pulmonary disease)    dilTIAZem 240 mg/24 hours oral capsule, extended release  -- 1 cap(s) by mouth once a day  -- Indication: For Left bundle branch block (LBBB)    gabapentin 100 mg oral capsule  -- 1 cap(s) by mouth 3 times a day  -- Indication: For Lumbago    citalopram 20 mg oral tablet  -- 1 tab(s) by mouth once a day  -- Indication: For depression    loratadine 10 mg oral tablet  -- 1 tab(s) by mouth once a day  -- Indication: For allergies    tiotropium 18 mcg inhalation capsule  -- 1 cap(s) inhaled once a day  -- Indication: For COPD (chronic obstructive pulmonary disease)    Symbicort 160 mcg-4.5 mcg/inh inhalation aerosol  -- 2 puff(s) inhaled 2 times a day  -- Indication: For COPD (chronic obstructive pulmonary disease)    Ventolin HFA 90 mcg/inh inhalation aerosol  -- 2 puff(s) inhaled 4 times a day, As Needed  -- Indication: For COPD (chronic obstructive pulmonary disease) I will START or STAY ON the medications listed below when I get home from the hospital:    predniSONE 10 mg oral tablet  -- 2 tab(s) by mouth  BID x 2 days then 3 tabs by mouth x 2 days then 2 tabs orally, x 2 days then 1 tab by mouth x 2 days then stop  -- It is very important that you take or use this exactly as directed.  Do not skip doses or discontinue unless directed by your doctor.  Obtain medical advice before taking any non-prescription drugs as some may affect the action of this medication.  Take with food or milk.    -- Indication: For COPD (chronic obstructive pulmonary disease)    HYDROcodone-acetaminophen 7.5 mg-325 mg oral tablet  -- 1 tab(s) by mouth 3 times a day, As Needed  -- Indication: For COugh    buprenorphine 20 mcg/hr transdermal film, extended release  -- 1 patch by transdermal patch once a week every Saturday  -- Indication: For COPD (chronic obstructive pulmonary disease)    dilTIAZem 240 mg/24 hours oral capsule, extended release  -- 1 cap(s) by mouth once a day  -- Indication: For Left bundle branch block (LBBB)    gabapentin 100 mg oral capsule  -- 1 cap(s) by mouth 3 times a day  -- Indication: For Lumbago    citalopram 20 mg oral tablet  -- 1 tab(s) by mouth once a day  -- Indication: For depression    loratadine 10 mg oral tablet  -- 1 tab(s) by mouth once a day  -- Indication: For allergies    Tudorza Pressair 400 mcg/inh inhalation powder  -- 400 microgram(s) inhaled 2 times a day   -- Check with your doctor before becoming pregnant.  For inhalation only.  Obtain medical advice before taking any non-prescription drugs as some may affect the action of this medication.    -- Indication: For COPD (chronic obstructive pulmonary disease)    Symbicort 160 mcg-4.5 mcg/inh inhalation aerosol  -- 2 puff(s) inhaled 2 times a day  -- Indication: For COPD (chronic obstructive pulmonary disease)    Ventolin HFA 90 mcg/inh inhalation aerosol  -- 2 puff(s) inhaled 4 times a day, As Needed  -- Indication: For COPD (chronic obstructive pulmonary disease)

## 2018-07-01 NOTE — DISCHARGE NOTE ADULT - HOME CARE AGENCY
[place] [id number] [phone] Visiting nurse will call 1-2 days after discahrge to arrange visit. Please call agency with any questions or concerns [facility]  [phone] Visiting nurse will call 1-2 days after discharge to arrange visit. Please call agency with any questions or concerns

## 2018-07-01 NOTE — DISCHARGE NOTE ADULT - CARE PLAN
Principal Discharge DX:	COPD (chronic obstructive pulmonary disease)  Goal:	Patient remains hemodynamically stable.  Assessment and plan of treatment:	Call your Health Care provider upon arrival home to make a follow up appointment within one week.  Take all inhalers as prescribed by your Health Care Provider.  Take steroids as prescribed by your Health Care Provider.  If your cough increases infrequency and severity and/or you have shortness of breath or increased shortness of breath call your Health Care Provider.  If you develop fever, chills, night sweats, malaise, and/or change in mental status call your Health care Provider.  Nutrition is very important.  Eat small frequent meals.  Increase your activity as tolerated.  Do not stay in bed all day  Secondary Diagnosis:	HTN (hypertension)  Assessment and plan of treatment:	Low salt diet  Activity as tolerated.  Take all medication as prescribed.  Follow up with your medical doctor for routine blood pressure monitoring at your next visit.  Notify your doctor if you have any of the following symptoms:   Dizziness, Lightheadedness, Blurry vision, Headache, Chest pain, Shortness of breath  Secondary Diagnosis:	Lumbago  Assessment and plan of treatment:	continue with home medication. Principal Discharge DX:	COPD (chronic obstructive pulmonary disease)  Goal:	Patient remains hemodynamically stable.  Assessment and plan of treatment:	Call your Health Care provider upon arrival home to make a follow up appointment within one week.  Take all inhalers as prescribed by your Health Care Provider.  Take steroids as prescribed by your Health Care Provider.  If your cough increases infrequency and severity and/or you have shortness of breath or increased shortness of breath call your Health Care Provider.  If you develop fever, chills, night sweats, malaise, and/or change in mental status call your Health care Provider.  Nutrition is very important.  Eat small frequent meals.  Increase your activity as tolerated.  Do not stay in bed all day  Secondary Diagnosis:	HTN (hypertension)  Assessment and plan of treatment:	Low salt diet  Activity as tolerated.  Take all medication as prescribed.  Follow up with your medical doctor for routine blood pressure monitoring at your next visit.  Notify your doctor if you have any of the following symptoms:   Dizziness, Lightheadedness, Blurry vision, Headache, Chest pain, Shortness of breath  Secondary Diagnosis:	Lumbago  Assessment and plan of treatment:	continue with home medication.  Secondary Diagnosis:	Pneumonia  Assessment and plan of treatment:	Pneumonia is a lung infection that can cause a fever, cough, and trouble breathing.  Nutrition is important, eat small frequent meals.  Get lots of rest and drink fluids.  Call your health care provider upon arrival home from hospital and make a follow up appointment for one week.  If your cough worsens, you develop fever greater than 101', you have shaking chills, a fast heartbeat, trouble breathing and/or feel your are breathing much faster than usual, call your healthcare provider.  Make sure you wash your hands frequently.

## 2018-07-01 NOTE — DISCHARGE NOTE ADULT - ADDITIONAL INSTRUCTIONS
Please follow up with your PMD within 1 week of discharge.  Follow up with your Pulmonologist as an outpatient.

## 2018-07-01 NOTE — DISCHARGE NOTE ADULT - CARE PROVIDER_API CALL
SchoenhausLuchs, Michelle S (MD), Internal Medicine  [address] [place] [address]  Phone: [phone]  Fax: [phone]    Delvin Branham (), Internal Medicine; Pulmonary Disease; Sleep Medicine  [address] [place] [address]  Phone: [phone]  Fax: [phone]

## 2018-07-01 NOTE — PROGRESS NOTE ADULT - PROBLEM SELECTOR PLAN 6
--VTE PPX with lovenox.

## 2018-07-01 NOTE — PROGRESS NOTE ADULT - PROBLEM SELECTOR PLAN 3
--continue with home cartia

## 2018-07-01 NOTE — PROGRESS NOTE ADULT - PROBLEM SELECTOR PROBLEM 5
Left bundle branch block (LBBB)

## 2018-07-01 NOTE — PROGRESS NOTE ADULT - PROBLEM SELECTOR PLAN 2
Gram positive bacterial RLL pneumonia present on admission

## 2018-07-01 NOTE — PROGRESS NOTE ADULT - PROBLEM SELECTOR PLAN 1
--known to have reactive airway disease, likely with underlying COPD given prolonged smoking history.   --Mild hypoxia in the ED (was 85% in the office today on RA). Currently on 4L NC with O2 sat about 95%.   --solumedrol 30 mg daily tid  --augmentin x 5 days  --c/w bronchodilators and inhaled symbicort  --check ambulatory O2 sat in the morning to assess response.  --positive RVP.  --doubt cardiac etiology of SOB as no heart failure in 2013 TTE in the system and no overt CHF symptoms. proBNP can be elevated in elderly patients (hers in 1600). Doubt VTE/PE as her Wells criteria is zero.  --appreciate pulm
--possible transition to prednisone today  --augmentin ; last day dose would be morning of 7/1  --c/w bronchodilators and inhaled symbicort  --positive RVP.  --appreciate pulm
prednisone taper as per pulm  --Augmentin; last day today 7/1. completed course.  --c/w bronchodilators and inhaled symbicort (decreased to 80), spiriva added 6/30  --positive RVP.  --appreciate pulm  --d/c planning home with home PT on Prednisone taper.
prednisone taper as per pulm  --augmentin ; last day dose would be morning of 7/1  --c/w bronchodilators and inhaled symbicort (decreased to 80), spiriva added 6/30  --positive RVP.  --appreciate pulm
--solumedrol 30 mg daily tid  --augmentin x 5 days  --c/w bronchodilators and inhaled symbicort  --positive RVP.  --appreciate pulm

## 2018-07-01 NOTE — DISCHARGE NOTE ADULT - PLAN OF CARE
Patient remains hemodynamically stable. Call your Health Care provider upon arrival home to make a follow up appointment within one week.  Take all inhalers as prescribed by your Health Care Provider.  Take steroids as prescribed by your Health Care Provider.  If your cough increases infrequency and severity and/or you have shortness of breath or increased shortness of breath call your Health Care Provider.  If you develop fever, chills, night sweats, malaise, and/or change in mental status call your Health care Provider.  Nutrition is very important.  Eat small frequent meals.  Increase your activity as tolerated.  Do not stay in bed all day Low salt diet  Activity as tolerated.  Take all medication as prescribed.  Follow up with your medical doctor for routine blood pressure monitoring at your next visit.  Notify your doctor if you have any of the following symptoms:   Dizziness, Lightheadedness, Blurry vision, Headache, Chest pain, Shortness of breath continue with home medication. Pneumonia is a lung infection that can cause a fever, cough, and trouble breathing.  Nutrition is important, eat small frequent meals.  Get lots of rest and drink fluids.  Call your health care provider upon arrival home from hospital and make a follow up appointment for one week.  If your cough worsens, you develop fever greater than 101', you have shaking chills, a fast heartbeat, trouble breathing and/or feel your are breathing much faster than usual, call your healthcare provider.  Make sure you wash your hands frequently.

## 2018-07-01 NOTE — DISCHARGE NOTE ADULT - PATIENT PORTAL LINK FT
You can access the [facility] Patient Portal, offered by [facility], by registering with the following website: [email or web address]

## 2018-07-01 NOTE — PROGRESS NOTE ADULT - PROBLEM SELECTOR PLAN 4
--hydrocodone not available. Will substitute with oxycodone.   --continue with home gabapentin.  --Istop  [id number]
--hydrocodone not available. Will substitute with oxycodone.   --continue with home gabapentin.  --Istop  [id number]
--hydrocodone not available. Will substitute with oxycodone.   --continue with home gabapentin.  --Istop  [id number]
--hydrocodone not available. Will substitute with oxycodone.   --continue with home gabapentin.  --Istop  [id number]
--hydrocodone not available. Will substitute with oxycodone.   --continue with home gabapentin.  --Istop  [id number]

## 2018-07-01 NOTE — DISCHARGE NOTE ADULT - CARE PROVIDERS DIRECT ADDRESSES
[email or web address]

## 2018-07-01 NOTE — DISCHARGE NOTE ADULT - HOSPITAL COURSE
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 Likely with reactive airway disease/bronchitis from viral infection with possible mild COPD exacerbation.     COPD exacerbation.  prednisone taper as per pulm, Augmentin; last day today 7/1. completed course. --c/w bronchodilators and inhaled symbicort (decreased to 80), spiriva added 6/30  --positive RVP.  d/c planning home with home PT on Prednisone taper.     R/O Pneumonia.  Gram positive bacterial RLL pneumonia present on admission.     HTN (hypertension).  continue with home cartia.      Lumbago.  --hydrocodone not available. Will substitute with oxycodone.   --continue with home gabapentin.  --Istop  [id number].   Left bundle branch block (LBBB).  --well known history, on EKG in 2013. No further work up at this time. 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 Likely with reactive airway disease/bronchitis from viral infection with possible mild COPD exacerbation.     COPD exacerbation.  prednisone taper as per pulm, Augmentin; last day today 7/1. completed course. --c/w bronchodilators and inhaled symbicort (decreased to 80), spiriva added 6/30  --positive RVP.  d/c planning home with home PT on Pre	dnisone taper.     R/O Pneumonia.  Gram positive bacterial RLL pneumonia present on admission.     HTN (hypertension).  continue with home cartia.      Lumbago.  --hydrocodone not available. Will substitute with oxycodone.   --continue with home gabapentin.  --Istop  [id number].   Left bundle branch block (LBBB).  --well known history, on EKG in 2013. No further work up at this time.

## 2018-07-02 VITALS
OXYGEN SATURATION: 92 % | RESPIRATION RATE: 18 BRPM | HEART RATE: 82 BPM | TEMPERATURE: 98 F | DIASTOLIC BLOOD PRESSURE: 71 MMHG | SYSTOLIC BLOOD PRESSURE: 131 MMHG

## 2018-07-02 LAB
ANION GAP SERPL CALC-SCNC: 12 MMOL/L — SIGNIFICANT CHANGE UP (ref 5–17)
BUN SERPL-MCNC: 16 MG/DL — SIGNIFICANT CHANGE UP (ref 7–23)
CALCIUM SERPL-MCNC: 8.8 MG/DL — SIGNIFICANT CHANGE UP (ref 8.4–10.5)
CHLORIDE SERPL-SCNC: 100 MMOL/L — SIGNIFICANT CHANGE UP (ref 96–108)
CO2 SERPL-SCNC: 27 MMOL/L — SIGNIFICANT CHANGE UP (ref 22–31)
CREAT SERPL-MCNC: 0.61 MG/DL — SIGNIFICANT CHANGE UP (ref 0.5–1.3)
GLUCOSE SERPL-MCNC: 110 MG/DL — HIGH (ref 70–99)
HCT VFR BLD CALC: 36.7 % — SIGNIFICANT CHANGE UP (ref 34.5–45)
HGB BLD-MCNC: 11.7 G/DL — SIGNIFICANT CHANGE UP (ref 11.5–15.5)
MCHC RBC-ENTMCNC: 31.4 PG — SIGNIFICANT CHANGE UP (ref 27–34)
MCHC RBC-ENTMCNC: 31.9 GM/DL — LOW (ref 32–36)
MCV RBC AUTO: 98.4 FL — SIGNIFICANT CHANGE UP (ref 80–100)
PLATELET # BLD AUTO: 294 K/UL — SIGNIFICANT CHANGE UP (ref 150–400)
POTASSIUM SERPL-MCNC: 4.3 MMOL/L — SIGNIFICANT CHANGE UP (ref 3.5–5.3)
POTASSIUM SERPL-SCNC: 4.3 MMOL/L — SIGNIFICANT CHANGE UP (ref 3.5–5.3)
RBC # BLD: 3.73 M/UL — LOW (ref 3.8–5.2)
RBC # FLD: 13.8 % — SIGNIFICANT CHANGE UP (ref 10.3–14.5)
SODIUM SERPL-SCNC: 139 MMOL/L — SIGNIFICANT CHANGE UP (ref 135–145)
WBC # BLD: 13.32 K/UL — HIGH (ref 3.8–10.5)
WBC # FLD AUTO: 13.32 K/UL — HIGH (ref 3.8–10.5)

## 2018-07-02 PROCEDURE — 94640 AIRWAY INHALATION TREATMENT: CPT

## 2018-07-02 PROCEDURE — 87798 DETECT AGENT NOS DNA AMP: CPT

## 2018-07-02 PROCEDURE — 84484 ASSAY OF TROPONIN QUANT: CPT

## 2018-07-02 PROCEDURE — 87086 URINE CULTURE/COLONY COUNT: CPT

## 2018-07-02 PROCEDURE — 93005 ELECTROCARDIOGRAM TRACING: CPT

## 2018-07-02 PROCEDURE — 85730 THROMBOPLASTIN TIME PARTIAL: CPT

## 2018-07-02 PROCEDURE — 97161 PT EVAL LOW COMPLEX 20 MIN: CPT

## 2018-07-02 PROCEDURE — 87581 M.PNEUMON DNA AMP PROBE: CPT

## 2018-07-02 PROCEDURE — 87486 CHLMYD PNEUM DNA AMP PROBE: CPT

## 2018-07-02 PROCEDURE — 87449 NOS EACH ORGANISM AG IA: CPT

## 2018-07-02 PROCEDURE — 83690 ASSAY OF LIPASE: CPT

## 2018-07-02 PROCEDURE — 81003 URINALYSIS AUTO W/O SCOPE: CPT

## 2018-07-02 PROCEDURE — 87633 RESP VIRUS 12-25 TARGETS: CPT

## 2018-07-02 PROCEDURE — 96374 THER/PROPH/DIAG INJ IV PUSH: CPT

## 2018-07-02 PROCEDURE — 80048 BASIC METABOLIC PNL TOTAL CA: CPT

## 2018-07-02 PROCEDURE — 83880 ASSAY OF NATRIURETIC PEPTIDE: CPT

## 2018-07-02 PROCEDURE — 80053 COMPREHEN METABOLIC PANEL: CPT

## 2018-07-02 PROCEDURE — 99285 EMERGENCY DEPT VISIT HI MDM: CPT | Mod: 25

## 2018-07-02 PROCEDURE — 85027 COMPLETE CBC AUTOMATED: CPT

## 2018-07-02 PROCEDURE — 84145 PROCALCITONIN (PCT): CPT

## 2018-07-02 PROCEDURE — 71250 CT THORAX DX C-: CPT

## 2018-07-02 PROCEDURE — 85610 PROTHROMBIN TIME: CPT

## 2018-07-02 PROCEDURE — 71046 X-RAY EXAM CHEST 2 VIEWS: CPT

## 2018-07-02 RX ORDER — TIOTROPIUM BROMIDE 18 UG/1
1 CAPSULE ORAL; RESPIRATORY (INHALATION)
Qty: 1 | Refills: 0 | OUTPATIENT
Start: 2018-07-02

## 2018-07-02 RX ORDER — ACLIDINIUM BROMIDE 400 UG/1
400 POWDER, METERED RESPIRATORY (INHALATION)
Qty: 1 | Refills: 0
Start: 2018-07-02 | End: 2018-07-31

## 2018-07-02 RX ADMIN — BUDESONIDE AND FORMOTEROL FUMARATE DIHYDRATE 2 PUFF(S): 160; 4.5 AEROSOL RESPIRATORY (INHALATION) at 05:08

## 2018-07-02 RX ADMIN — CITALOPRAM 20 MILLIGRAM(S): 10 TABLET, FILM COATED ORAL at 11:16

## 2018-07-02 RX ADMIN — GABAPENTIN 100 MILLIGRAM(S): 400 CAPSULE ORAL at 05:06

## 2018-07-02 RX ADMIN — Medication 30 MILLIGRAM(S): at 05:07

## 2018-07-02 RX ADMIN — Medication 3 MILLILITER(S): at 11:15

## 2018-07-02 RX ADMIN — Medication 100 MILLIGRAM(S): at 05:08

## 2018-07-02 RX ADMIN — Medication 240 MILLIGRAM(S): at 05:07

## 2018-07-02 RX ADMIN — Medication 3 MILLILITER(S): at 05:06

## 2018-07-02 RX ADMIN — LORATADINE 10 MILLIGRAM(S): 10 TABLET ORAL at 11:16

## 2018-07-02 RX ADMIN — ENOXAPARIN SODIUM 40 MILLIGRAM(S): 100 INJECTION SUBCUTANEOUS at 05:07

## 2018-07-02 NOTE — PROGRESS NOTE ADULT - SUBJECTIVE AND OBJECTIVE BOX
Breathing steadily improving but only sleeping 3-4 houirs a night    Vital Signs Last 24 Hrs  T(C): 36.7 (29 Jun 2018 04:40), Max: 36.7 (28 Jun 2018 12:00)  T(F): 98 (29 Jun 2018 04:40), Max: 98.1 (28 Jun 2018 12:00)  HR: 67 (29 Jun 2018 04:40) (67 - 79)  BP: 114/60 (29 Jun 2018 04:40) (114/60 - 129/58)  BP(mean): --  RR: 17 (29 Jun 2018 04:40) (17 - 18)  SpO2: 97% (29 Jun 2018 04:40) (94% - 97%)    GENERAL: NAD, well-developed  HEAD:  Atraumatic, Normocephalic. No sinus tenderness.   EYES: EOMI  NECK: Supple, No JVD  CHEST/LUNG: improved rhonchi, minimal wheezing  HEART: Regular rate and rhythm; No murmurs, rubs, or gallops  ABDOMEN: Soft, Nontender, Nondistended; Bowel sounds present  EXTREMITIES: Bilateral ankle deformities, 1-2+ non-pitting edema above ankles; 2+ Peripheral Pulses, No clubbing, cyanosis  NEUROLOGY: non-focal  SKIN: No rashes or lesions    LABS:                        11.5   10.23 )-----------( 283      ( 28 Jun 2018 08:12 )             35.6     06-28    141  |  100  |  12  ----------------------------<  147<H>  3.5   |  28  |  0.52    Ca    9.7      28 Jun 2018 06:58        CAPILLARY BLOOD GLUCOSE
Follow-up Pulm Progress Note  Aftab Zamarripa MD  [phone]    No new respiratory events overnight.    Maredly improved today: on 30 q8 slumedrol  Ambulating in room on nasal O2  AFebrile on augmentin    Vital Signs Last 24 Hrs  T(C): 36.7 (30 Jun 2018 04:30), Max: 36.7 (30 Jun 2018 04:30)  T(F): 98.1 (30 Jun 2018 04:30), Max: 98.1 (30 Jun 2018 04:30)  HR: 72 (30 Jun 2018 04:30) (72 - 80)  BP: 118/56 (30 Jun 2018 04:30) (118/56 - 144/90)  BP(mean): --  RR: 17 (30 Jun 2018 04:30) (17 - 17)  SpO2: 90% (30 Jun 2018 04:30) (90% - 90%)                        11.9   16.01 )-----------( 316      ( 30 Jun 2018 08:20 )             37.1       06-30    142  |  99  |  21  ----------------------------<  133<H>  4.1   |  29  |  0.63    Ca    9.3      30 Jun 2018 07:00       PTT - ( 26 Jun 2018 13:05 )  PTT:32.2 sec  Procalcitonin, Serum: 0.09 ng/mL (06-27-18 @ 06:02)    Serum Pro-Brain Natriuretic Peptide: 1611 pg/mL (06-26-18 @ 13:05)      CULTURES:  Culture Results:   <10,000 CFU/ml  Normal Urogenital zarina present (06-26 @ 17:32)    Most recent blood culture -- 06-26 @ 17:32   -- -- .Urine Clean Catch (Midstream) 06-26 @ 17:32      Physical Examination:  PULM: Few scattered rhonchi, no wheeze  CVS: Regular rate and rhythm, no murmurs, rubs, or gallops  ABD: Soft, non-tender  EXT:  No clubbing, cyanosis, or edema    RADIOLOGY REVIEWED  CXR:    CT chest:    TTE:
Follow-up Pulm Progress Note  Aftab Zamarripa MD  [phone]    No new respiratory events overnight.    Feels somwhat better, was OOB this AM  AFebrile on augmentin    Medications:  Vital Signs Last 24 Hrs  T(C): 36.3 (28 Jun 2018 04:32), Max: 36.7 (27 Jun 2018 13:29)  T(F): 97.4 (28 Jun 2018 04:32), Max: 98 (27 Jun 2018 13:29)  HR: 62 (28 Jun 2018 05:41) (62 - 77)  BP: 118/61 (28 Jun 2018 05:41) (114/59 - 144/63)  BP(mean): --  RR: 16 (28 Jun 2018 04:32) (16 - 18)  SpO2: 95% (28 Jun 2018 04:32) (92% - 95%)      06-27 @ 07:01  -  06-28 @ 07:00  --------------------------------------------------------  IN: 1100 mL / OUT: 300 mL / NET: 800 mL        LABS:                        11.5   10.23 )-----------( 283      ( 28 Jun 2018 08:12 )             35.6     06-28    141  |  100  |  12  ----------------------------<  147<H>  3.5   |  28  |  0.52    Ca    9.7      28 Jun 2018 06:58    TPro  7.1  /  Alb  4.2  /  TBili  0.4  /  DBili  x   /  AST  23  /  ALT  17  /  AlkPhos  78  06-26      PT/INR - ( 26 Jun 2018 13:05 )   PT: 11.4 sec;   INR: 1.04 ratio         PTT - ( 26 Jun 2018 13:05 )  PTT:32.2 sec  Procalcitonin, Serum: 0.09 ng/mL (06-27-18 @ 06:02)    Serum Pro-Brain Natriuretic Peptide: 1611 pg/mL (06-26-18 @ 13:05)      CULTURES:  Culture Results:   <10,000 CFU/ml  Normal Urogenital zarina present (06-26 @ 17:32)    Most recent blood culture -- 06-26 @ 17:32   -- -- .Urine Clean Catch (Midstream) 06-26 @ 17:32      Physical Examination:  PULM: Bilateral exp rhonchi/wheeze - slightly improved  CVS: Regular rate and rhythm, no murmurs, rubs, or gallops  ABD: Soft, non-tender  EXT:  No clubbing, cyanosis, or edema    RADIOLOGY REVIEWED  CXR:    CT chest:    TTE:
Follow-up Pulm Progress Note  Aftab Zamarripa MD  [phone]    No new respiratory events overnight.    Maredly improved today: on prednisone taper  RA sats 93%  AFebrile off antibiotics    Vital Signs Last 24 Hrs  T(C): 36.3 (02 Jul 2018 07:31), Max: 36.7 (01 Jul 2018 20:50)  T(F): 97.4 (02 Jul 2018 07:31), Max: 98.1 (01 Jul 2018 20:50)  HR: 80 (02 Jul 2018 07:31) (6 - 80)  BP: 148/72 (02 Jul 2018 07:31) (128/73 - 148/72)  BP(mean): --  RR: 17 (02 Jul 2018 07:31) (16 - 18)  SpO2: 92% (02 Jul 2018 07:31) (90% - 93%)                        11.7   13.32 )-----------( 294      ( 02 Jul 2018 08:08 )             36.7       07-02    139  |  100  |  16  ----------------------------<  110<H>  4.3   |  27  |  0.61    Ca    8.8      02 Jul 2018 06:27       PTT - ( 26 Jun 2018 13:05 )  PTT:32.2 sec  Procalcitonin, Serum: 0.09 ng/mL (06-27-18 @ 06:02)    Serum Pro-Brain Natriuretic Peptide: 1611 pg/mL (06-26-18 @ 13:05)      CULTURES:  Culture Results:   <10,000 CFU/ml  Normal Urogenital zarina present (06-26 @ 17:32)    Most recent blood culture -- 06-26 @ 17:32   -- -- .Urine Clean Catch (Midstream) 06-26 @ 17:32      Physical Examination:  PULM: Few basilar crackles, no wheeze  CVS: Regular rate and rhythm, no murmurs, rubs, or gallops  ABD: Soft, non-tender  EXT:  No clubbing, cyanosis, or edema    RADIOLOGY REVIEWED  CXR:    CT chest:    TTE:
Follow-up Pulm Progress Note  Aftab Zamarripa MD  [phone]    No new respiratory events overnight.    Continue to improve, ambulated with some dyspnea  AFebrile on augmentin    Vital Signs Last 24 Hrs  T(C): 36.7 (29 Jun 2018 04:40), Max: 36.7 (28 Jun 2018 19:58)  T(F): 98 (29 Jun 2018 04:40), Max: 98.1 (28 Jun 2018 19:58)  HR: 67 (29 Jun 2018 04:40) (67 - 71)  BP: 114/60 (29 Jun 2018 04:40) (114/60 - 122/62)  BP(mean): --  RR: 17 (29 Jun 2018 09:06) (17 - 17)  SpO2: 88% (29 Jun 2018 09:06) (88% - 97%)                          11.1   12.94 )-----------( 299      ( 29 Jun 2018 08:15 )             34.4       06-29    141  |  98  |  21  ----------------------------<  139<H>  3.9   |  31  |  0.66    Ca    9.4      29 Jun 2018 06:34       PTT - ( 26 Jun 2018 13:05 )  PTT:32.2 sec  Procalcitonin, Serum: 0.09 ng/mL (06-27-18 @ 06:02)    Serum Pro-Brain Natriuretic Peptide: 1611 pg/mL (06-26-18 @ 13:05)      CULTURES:  Culture Results:   <10,000 CFU/ml  Normal Urogenital zarina present (06-26 @ 17:32)    Most recent blood culture -- 06-26 @ 17:32   -- -- .Urine Clean Catch (Midstream) 06-26 @ 17:32      Physical Examination:  PULM: Bilateral exp rhonchi/wheeze - improved vs 6/28  CVS: Regular rate and rhythm, no murmurs, rubs, or gallops  ABD: Soft, non-tender  EXT:  No clubbing, cyanosis, or edema    RADIOLOGY REVIEWED  CXR:    CT chest:    TTE:
Patient is a 83y old  Female who presents with a chief complaint of dyspnea (26 Jun 2018 20:39)      SUBJECTIVE / OVERNIGHT EVENTS:  feels well.  lives at home with the family.   breathing better.  hard of hearing (left hearing aids at home)  no cp, no sob, no n/v/d. no abdominal pain.  no headache, no dizziness.       Vital Signs Last 24 Hrs  T(C): 36.6 (01 Jul 2018 12:25), Max: 37 (30 Jun 2018 20:58)  T(F): 97.9 (01 Jul 2018 12:25), Max: 98.6 (30 Jun 2018 20:58)  HR: 6 (01 Jul 2018 12:25) (6 - 80)  BP: 128/73 (01 Jul 2018 12:25) (117/66 - 128/73)  BP(mean): --  RR: 18 (01 Jul 2018 12:25) (17 - 20)  SpO2: 92% (01 Jul 2018 12:25) (89% - 93%)  I&O's Summary    30 Jun 2018 07:01  -  01 Jul 2018 07:00  --------------------------------------------------------  IN: 720 mL / OUT: 0 mL / NET: 720 mL    01 Jul 2018 07:01  -  01 Jul 2018 15:01  --------------------------------------------------------  IN: 480 mL / OUT: 0 mL / NET: 480 mL        PHYSICAL EXAM:  GENERAL: NAD, Comfortable, obese  HEAD:  Atraumatic, Normocephalic, hard of hearing  EYES: EOMI, PERRLA, conjunctiva and sclera clear  NECK: Supple, No JVD  CHEST/LUNG: Clear to auscultation bilaterally; No wheeze  HEART: Regular rate and rhythm; No murmurs, rubs, or gallops  ABDOMEN: Soft, Nontender, Nondistended; Bowel sounds present  Neuro: AAO x 3, no focal deficit, 5/5 b/l extremities  EXTREMITIES:  2+ Peripheral Pulses, No clubbing, cyanosis, or edema  SKIN: No rashes or lesions    LABS:                        11.9   16.01 )-----------( 316      ( 30 Jun 2018 08:20 )             37.1     06-30    142  |  99  |  21  ----------------------------<  133<H>  4.1   |  29  |  0.63    Ca    9.3      30 Jun 2018 07:00        CAPILLARY BLOOD GLUCOSE                RADIOLOGY & ADDITIONAL TESTS:    Imaging Personally Reviewed:  [x] YES  [ ] NO    Consultant(s) Notes Reviewed:  [x] YES  [ ] NO      MEDICATIONS  (STANDING):  ALBUTerol/ipratropium for Nebulization 3 milliLiter(s) Nebulizer every 6 hours  buDESOnide  80 MICROgram(s)/formoterol 4.5 MICROgram(s) Inhaler 2 Puff(s) Inhalation two times a day  citalopram 20 milliGRAM(s) Oral daily  diltiazem    milliGRAM(s) Oral daily  docusate sodium 100 milliGRAM(s) Oral three times a day  enoxaparin Injectable 40 milliGRAM(s) SubCutaneous every 24 hours  gabapentin 100 milliGRAM(s) Oral three times a day  loratadine 10 milliGRAM(s) Oral daily  predniSONE   Tablet 30 milliGRAM(s) Oral two times a day  predniSONE   Tablet   Oral   senna 2 Tablet(s) Oral at bedtime  tiotropium 18 MICROgram(s) Capsule 1 Capsule(s) Inhalation daily    MEDICATIONS  (PRN):  guaiFENesin    Syrup 200 milliGRAM(s) Oral every 6 hours PRN Cough  oxyCODONE    5 mG/acetaminophen 325 mG 1 Tablet(s) Oral three times a day PRN mild and moderate pain      Care Discussed with Consultants/Other Providers [x] YES  [ ] NO    HEALTH ISSUES - PROBLEM Dx:  Need for prophylactic measure: Need for prophylactic measure  Left bundle branch block (LBBB): Left bundle branch block (LBBB)  Lumbago: Lumbago  HTN (hypertension): HTN (hypertension)  COPD exacerbation: COPD exacerbation
Steadily improving; cough is loosening    Vital Signs Last 24 Hrs  T(C): 36.7 (30 Jun 2018 04:30), Max: 36.7 (30 Jun 2018 04:30)  T(F): 98.1 (30 Jun 2018 04:30), Max: 98.1 (30 Jun 2018 04:30)  HR: 72 (30 Jun 2018 04:30) (72 - 80)  BP: 118/56 (30 Jun 2018 04:30) (118/56 - 144/90)  BP(mean): --  RR: 17 (30 Jun 2018 04:30) (17 - 17)  SpO2: 90% (30 Jun 2018 04:30) (90% - 90%)    GENERAL: NAD, well-developed  HEAD:  Atraumatic, Normocephalic. No sinus tenderness.   EYES: EOMI  NECK: Supple, No JVD  CHEST/LUNG: improved rhonchi, minimal wheezing  HEART: Regular rate and rhythm; No murmurs, rubs, or gallops  ABDOMEN: Soft, Nontender, Nondistended; Bowel sounds present  EXTREMITIES: Bilateral ankle deformities, 1-2+ non-pitting edema above ankles; 2+ Peripheral Pulses, No clubbing, cyanosis  NEUROLOGY: non-focal  SKIN: No rashes or lesions    LABS:                        11.9   16.01 )-----------( 316      ( 30 Jun 2018 08:20 )             37.1     06-30    142  |  99  |  21  ----------------------------<  133<H>  4.1   |  29  |  0.63    Ca    9.3      30 Jun 2018 07:00        CAPILLARY BLOOD GLUCOSE
Tolerating NC so far  Occasional dry cough  Not acutely SOB    Vital Signs Last 24 Hrs  T(C): 36.9 (2018 09:39), Max: 37.1 (2018 15:05)  T(F): 98.5 (2018 09:39), Max: 98.8 (2018 15:05)  HR: 70 (2018 09:39) (59 - 70)  BP: 105/56 (2018 09:39) (105/56 - 137/61)  BP(mean): --  RR: 18 (2018 09:39) (18 - 20)  SpO2: 94% (2018 09:39) (94% - 95%)    GENERAL: NAD, well-developed  HEAD:  Atraumatic, Normocephalic. No sinus tenderness.   EYES: EOMI  NECK: Supple, No JVD  CHEST/LUNG: Rhonchi bilateral lower lung fields, R worse than L; no wheezing currently. No crackles.   HEART: Regular rate and rhythm; No murmurs, rubs, or gallops  ABDOMEN: Soft, Nontender, Nondistended; Bowel sounds present  EXTREMITIES: Bilateral ankle deformities, 1-2+ non-pitting edema above ankles; 2+ Peripheral Pulses, No clubbing, cyanosis  NEUROLOGY: non-focal  SKIN: No rashes or lesions    LABS:                        10.6   9.23  )-----------( 233      ( 2018 08:14 )             32.4     06-27    141  |  99  |  14  ----------------------------<  92  3.6   |  27  |  0.71    Ca    9.2      2018 06:02    TPro  7.1  /  Alb  4.2  /  TBili  0.4  /  DBili  x   /  AST  23  /  ALT  17  /  AlkPhos  78  06-26    PT/INR - ( 2018 13:05 )   PT: 11.4 sec;   INR: 1.04 ratio         PTT - ( 2018 13:05 )  PTT:32.2 sec  CAPILLARY BLOOD GLUCOSE            Urinalysis Basic - ( 2018 15:14 )    Color: PL Yellow / Appearance: Clear / S.007 / pH: x  Gluc: x / Ketone: Negative  / Bili: Negative / Urobili: Negative   Blood: x / Protein: Negative / Nitrite: Negative   Leuk Esterase: Negative / RBC: x / WBC x   Sq Epi: x / Non Sq Epi: x / Bacteria: x
Says she slept for 4 hours, which is better than in the past few days since she became sick    Vital Signs Last 24 Hrs  T(C): 36.3 (2018 04:32), Max: 36.7 (2018 13:29)  T(F): 97.4 (2018 04:32), Max: 98 (2018 13:29)  HR: 62 (2018 05:41) (62 - 77)  BP: 118/61 (2018 05:41) (114/59 - 144/63)  BP(mean): --  RR: 16 (2018 04:32) (16 - 18)  SpO2: 95% (2018 04:32) (92% - 95%)    GENERAL: NAD, well-developed  HEAD:  Atraumatic, Normocephalic. No sinus tenderness.   EYES: EOMI  NECK: Supple, No JVD  CHEST/LUNG: Rhonchi bilateral lower lung fields, R worse than L; no wheezing currently. No crackles.   HEART: Regular rate and rhythm; No murmurs, rubs, or gallops  ABDOMEN: Soft, Nontender, Nondistended; Bowel sounds present  EXTREMITIES: Bilateral ankle deformities, 1-2+ non-pitting edema above ankles; 2+ Peripheral Pulses, No clubbing, cyanosis  NEUROLOGY: non-focal  SKIN: No rashes or lesions    LABS:                        11.5   10.23 )-----------( 283      ( 2018 08:12 )             35.6     -    141  |  100  |  12  ----------------------------<  147<H>  3.5   |  28  |  0.52    Ca    9.7      2018 06:58    TPro  7.1  /  Alb  4.2  /  TBili  0.4  /  DBili  x   /  AST  23  /  ALT  17  /  AlkPhos  78  06-26    PT/INR - ( 2018 13:05 )   PT: 11.4 sec;   INR: 1.04 ratio         PTT - ( 2018 13:05 )  PTT:32.2 sec  CAPILLARY BLOOD GLUCOSE            Urinalysis Basic - ( 2018 15:14 )    Color: PL Yellow / Appearance: Clear / S.007 / pH: x  Gluc: x / Ketone: Negative  / Bili: Negative / Urobili: Negative   Blood: x / Protein: Negative / Nitrite: Negative   Leuk Esterase: Negative / RBC: x / WBC x   Sq Epi: x / Non Sq Epi: x / Bacteria: x

## 2018-07-02 NOTE — PROGRESS NOTE ADULT - ASSESSMENT
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 Likely with reactive airway disease/bronchitis from viral infection with possible mild COPD exacerbation.
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 Likely with reactive airway disease/bronchitis from viral infection with possible mild COPD exacerbation. Would rule out PNA with further imaging.
Rhinovirus + COPD exacerbation with acute bronchospasm  Multilobar pneumonia - yet non toxic, possible treated residua    REC    Continue solumedrol 30 q8 : likely taper on 6/29  COntinue nebulizers  Compalete 5 days augmentin  Check sats on RA  Mobilize
Rhinovirus + COPD exacerbation with acute bronchospasm  Multilobar pneumonia - yet non toxic, possible treated residua    REC    Continue solumedrol 30 q8 today - taper 6/29 per status  COntinue nebulizers  DC abx on day # 5  Check sats on RA  Mobilize
Rhinovirus + COPD exacerbation with acute bronchospasm: clinically resolving  Multilobar pneumonia - clinically resolved, off abx    REC    Prednisone taper ordererd  Decrease symbicort to 80/ bid and add spiriva  DC planning - clear from pulm POV for DC   Outpatient f/u Dr Tyler/Yonas
Rhinovirus + COPD exacerbation with acute bronchospasm: clinically resolving  Multilobar pneumonia - yet non toxic, possible treated residua    REC    Prednisone taper ordererd  Decrease symbicort to 80/ bid and add spiriva  DC augmentin after today's doses  Check sats on RA  DC planning - clear from pulm POV for DC on 7/1 [place]  Outpatient f/u Dr Tyler/Yonas
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 Likely with reactive airway disease/bronchitis from viral infection with possible mild COPD exacerbation.

## 2018-07-02 NOTE — PROGRESS NOTE ADULT - PROVIDER SPECIALTY LIST ADULT
Internal Medicine
Pulmonology
Internal Medicine

## 2018-10-18 ENCOUNTER — APPOINTMENT (OUTPATIENT)
Dept: RADIATION ONCOLOGY | Facility: CLINIC | Age: 83
End: 2018-10-18
Payer: MEDICARE

## 2018-10-18 VITALS
HEART RATE: 69 BPM | RESPIRATION RATE: 18 BRPM | SYSTOLIC BLOOD PRESSURE: 144 MMHG | DIASTOLIC BLOOD PRESSURE: 68 MMHG | OXYGEN SATURATION: 94 %

## 2018-10-18 VITALS — WEIGHT: 134.48 LBS | BODY MASS INDEX: 24.6 KG/M2

## 2018-10-18 PROBLEM — J44.9 CHRONIC OBSTRUCTIVE PULMONARY DISEASE, UNSPECIFIED: Chronic | Status: ACTIVE | Noted: 2018-06-26

## 2018-10-18 PROCEDURE — 99213 OFFICE O/P EST LOW 20 MIN: CPT

## 2018-10-18 RX ORDER — KRILL/OM-3/DHA/EPA/PHOSPHO/AST 1000-230MG
81 CAPSULE ORAL
Refills: 0 | Status: ACTIVE | COMMUNITY
Start: 2018-10-18

## 2018-10-18 RX ORDER — BUPRENORPHINE 20 UG/H
20 PATCH, EXTENDED RELEASE TRANSDERMAL
Refills: 0 | Status: ACTIVE | COMMUNITY
Start: 2018-10-18

## 2018-10-18 RX ORDER — BUDESONIDE AND FORMOTEROL FUMARATE DIHYDRATE 160; 4.5 UG/1; UG/1
160-4.5 AEROSOL RESPIRATORY (INHALATION)
Refills: 0 | Status: ACTIVE | COMMUNITY
Start: 2018-10-18

## 2018-10-18 NOTE — REASON FOR VISIT
[Routine Follow-Up] : routine follow-up visit for [Breast Cancer] : breast cancer [T: ___] : T[unfilled] [Family Member] : family member

## 2018-10-18 NOTE — VITALS
[Pain Description/Quality: ___] : Pain description/quality: [unfilled] [Pain Duration: ___] : Pain duration: [unfilled] [Pain Location: ___] : Pain Location: [unfilled] [Opioid] : opioid [80: Normal activity with effort; some signs or symptoms of disease.] : 80: Normal activity with effort; some signs or symptoms of disease.  [Maximal Pain Intensity: 8/10] : 8/10 [Least Pain Intensity: 0/10] : 0/10

## 2018-10-22 NOTE — HISTORY OF PRESENT ILLNESS
[FreeTextEntry1] : 84 y/o with DCIS of the right breast, ER/SC negative s/p breast conserving therapy and adjuvant radiation therapy from 1/7/15-1/30/15. She is s/p needle biopsy of calcifications in the right breast anterior to the lumpectomy site in 2015, results consistent with changes in the breast related to radiation. F/U mammo on 8/31/15 did not note any changes to the calcifications. F/U b/l mammo was done 6/2017. She did have a mammogram on 6/25/18 at [facility]\par She continues  to have lower back pain and foot pain, followed by pain management. \par \par 10/18/18- Today she feels well. She has an appointment withfor surgical follow up, in November. Follows with Dr. Perez every 6 months- last saw in June.

## 2018-10-22 NOTE — REVIEW OF SYSTEMS
[Recent Change In Weight] : ~T recent weight change [Cough] : cough [Easy Bleeding] : a tendency for easy bleeding [Easy Bruising] : a tendency for easy bruising [Negative] : Psychiatric [Fever] : no fever [Chills] : no chills [Night Sweats] : no night sweats [Chest Pain] : no chest pain [Palpitations] : no palpitations [Shortness Of Breath] : no shortness of breath [Insomnia] : no insomnia [Anxiety] : no anxiety [Depression] : no depression [FreeTextEntry9] : back pain [FreeTextEntry6] : skin thickening

## 2018-10-22 NOTE — PHYSICAL EXAM
[Normal] : well developed, well nourished, in no acute distress [Cervical Lymph Nodes Enlarged Anterior Bilaterally] : anterior cervical [Supraclavicular Lymph Nodes Enlarged Bilaterally] : supraclavicular [Axillary Lymph Nodes Enlarged Bilaterally] : axillary [Affect] : the affect was normal [de-identified] : right breast without mass left breast smaller than right with cutaneous edema mild telangectasia

## 2019-08-30 ENCOUNTER — APPOINTMENT (OUTPATIENT)
Dept: ORTHOPEDIC SURGERY | Facility: CLINIC | Age: 84
End: 2019-08-30
Payer: MEDICARE

## 2019-08-30 VITALS
DIASTOLIC BLOOD PRESSURE: 77 MMHG | BODY MASS INDEX: 24.29 KG/M2 | WEIGHT: 132 LBS | HEART RATE: 75 BPM | SYSTOLIC BLOOD PRESSURE: 147 MMHG | HEIGHT: 62 IN

## 2019-08-30 DIAGNOSIS — M54.16 RADICULOPATHY, LUMBAR REGION: ICD-10-CM

## 2019-08-30 DIAGNOSIS — M48.07 SPINAL STENOSIS, LUMBOSACRAL REGION: ICD-10-CM

## 2019-08-30 PROCEDURE — 99203 OFFICE O/P NEW LOW 30 MIN: CPT

## 2019-08-30 NOTE — DISCUSSION/SUMMARY
[Medication Risks Reviewed] : Medication risks reviewed [de-identified] : She does not appear to sustain any fractures with her recent fall.  There is no evidence of any problems with her prior instrumentation and fusion.  I have recommended rest and heat.  She will use Tylenol and she has been taking Vicodin on a regular basis for her chronic back symptoms.  I will see her for follow-up in 4 weeks on a as needed basis if she is not making satisfactory improvement.

## 2019-08-30 NOTE — HISTORY OF PRESENT ILLNESS
[de-identified] : This 84-year-old woman is seen in the office today along with her daughter.  She had a fall over a very low step in her son's house a week ago and has been having lower back pain.  She had a prior spinal fusion and instrumentation at the L4-5 level in 1990 and has had chronic pain since then for which she takes Vicodin on a regular basis.  She was seen at a walk-in clinic yesterday and had x-rays of the cervical spine, the ribs, the thoracic spine, the pelvis and hips and the lumbar spine.  She has known old compression fractures in the lumbar spine above her prior instrumentation and fusion.  On further questioning the pain is actually more in the right buttock.  She is concerned that she has done something to her screws and rods in her back.

## 2019-08-30 NOTE — PHYSICAL EXAM
[de-identified] : With ambulation she walks with a slow gait.  Straight leg raising is negative to 90 degrees. [de-identified] : I reviewed a myriad of prior x-rays.  X-rays of the cervical spine revealed moderate multilevel degenerative changes with disc space narrowing.  There is no evidence of fracture and there no destructive changes.  X-rays of the ribs shows no evidence of rib fracture.  There is no evidence of the pleural effusion or hemothorax and there is no pneumothorax.  X-rays of the thoracic spine reveal an old appearing deformity of one vertebral body that I do not think represents an acute fracture.  X-ray of the pelvis and hips shows no evidence of a fracture and there are no destructive changes.  X-rays of the lumbar spine reveal an instrumented spinal fusion at the L4-5 level with a significant scoliosis with an old appearing superior endplate compression deformities of L1 and L2.  There are no destructive changes.

## 2019-08-30 NOTE — REASON FOR VISIT
[Initial Visit] : an initial visit for [Degenerative Joint Disease] : degenerative joint disease [Back Pain] : back pain [Family Member] : family member

## 2019-09-09 ENCOUNTER — APPOINTMENT (OUTPATIENT)
Dept: ORTHOPEDIC SURGERY | Facility: CLINIC | Age: 84
End: 2019-09-09

## 2019-10-23 NOTE — VITALS
[Maximal Pain Intensity: 8/10] : 8/10 [Least Pain Intensity: 0/10] : 0/10 [Pain Description/Quality: ___] : Pain description/quality: [unfilled] [Pain Location: ___] : Pain Location: [unfilled] [Pain Duration: ___] : Pain duration: [unfilled] [Opioid] : opioid [80: Normal activity with effort; some signs or symptoms of disease.] : 80: Normal activity with effort; some signs or symptoms of disease.

## 2019-10-24 ENCOUNTER — APPOINTMENT (OUTPATIENT)
Dept: RADIATION ONCOLOGY | Facility: CLINIC | Age: 84
End: 2019-10-24
Payer: MEDICARE

## 2019-10-24 VITALS
HEART RATE: 72 BPM | SYSTOLIC BLOOD PRESSURE: 157 MMHG | HEIGHT: 62 IN | WEIGHT: 127.21 LBS | OXYGEN SATURATION: 93 % | DIASTOLIC BLOOD PRESSURE: 70 MMHG | TEMPERATURE: 99.6 F | BODY MASS INDEX: 23.41 KG/M2

## 2019-10-24 PROCEDURE — 99212 OFFICE O/P EST SF 10 MIN: CPT

## 2019-10-24 NOTE — REASON FOR VISIT
[Breast Cancer] : breast cancer [Routine Follow-Up] : routine follow-up visit for [Family Member] : family member

## 2019-11-06 NOTE — HISTORY OF PRESENT ILLNESS
[FreeTextEntry1] : Jesica Arguello is a 85 y/o with DCIS of the right breast, ER/DE negative s/p breast conserving therapy and adjuvant radiation therapy from 1/7/15-1/30/15. She is s/p needle biopsy of calcifications in the right breast anterior to the lumpectomy site in 2015, results consistent with changes in the breast related to radiation.  She has been followed with imaging and examination since that time.\par \par She returns today for a routine follow up. Mammo done in June 2019. No new complaints.

## 2019-11-06 NOTE — REVIEW OF SYSTEMS
[Recent Change In Weight] : ~T recent weight change [Cough] : cough [Easy Bleeding] : a tendency for easy bleeding [Easy Bruising] : a tendency for easy bruising [Negative] : Psychiatric [Fatigue: Grade 0] : Fatigue: Grade 0 [Fever] : no fever [Chills] : no chills [Night Sweats] : no night sweats [Chest Pain] : no chest pain [Palpitations] : no palpitations [Shortness Of Breath] : no shortness of breath [Insomnia] : no insomnia [Anxiety] : no anxiety [Depression] : no depression [FreeTextEntry9] : back pain [Skin Atrophy: Grade 1 - Covering <10% BSA; associated with telangiectasias or changes in skin color] : Skin Atrophy: Grade 1 - Covering <10% BSA; associated with telangiectasias or changes in skin color [FreeTextEntry6] : skin thickening

## 2019-11-06 NOTE — PHYSICAL EXAM
[Normal] : well developed, well nourished, in no acute distress [Cervical Lymph Nodes Enlarged Anterior Bilaterally] : anterior cervical [Supraclavicular Lymph Nodes Enlarged Bilaterally] : supraclavicular [Affect] : the affect was normal [Axillary Lymph Nodes Enlarged Bilaterally] : axillary [] : no respiratory distress [de-identified] : left breast without massrightbreast smaller than right with cutaneous edema mild telangectasia

## 2020-10-24 NOTE — REASON FOR VISIT
[Routine Follow-Up] : routine follow-up visit for [Breast Cancer] : breast cancer [Family Member] : family member

## 2020-10-29 ENCOUNTER — APPOINTMENT (OUTPATIENT)
Dept: RADIATION ONCOLOGY | Facility: CLINIC | Age: 85
End: 2020-10-29
Payer: MEDICARE

## 2020-10-29 VITALS
DIASTOLIC BLOOD PRESSURE: 72 MMHG | TEMPERATURE: 95.5 F | OXYGEN SATURATION: 92 % | HEART RATE: 71 BPM | BODY MASS INDEX: 22.42 KG/M2 | RESPIRATION RATE: 18 BRPM | WEIGHT: 122.57 LBS | SYSTOLIC BLOOD PRESSURE: 115 MMHG

## 2020-10-29 DIAGNOSIS — Z86.000 PERSONAL HISTORY OF IN-SITU NEOPLASM OF BREAST: ICD-10-CM

## 2020-10-29 PROCEDURE — 99072 ADDL SUPL MATRL&STAF TM PHE: CPT

## 2020-10-29 PROCEDURE — 99211 OFF/OP EST MAY X REQ PHY/QHP: CPT | Mod: GC

## 2020-10-29 RX ORDER — ROSUVASTATIN CALCIUM 5 MG/1
TABLET, FILM COATED ORAL
Refills: 0 | Status: ACTIVE | COMMUNITY

## 2020-10-29 RX ORDER — GABAPENTIN 600 MG/1
600 TABLET, COATED ORAL
Refills: 0 | Status: ACTIVE | COMMUNITY

## 2020-10-29 RX ORDER — HYDROCODONE BITARTRATE AND ACETAMINOPHEN 5; 300 MG/1; MG/1
TABLET ORAL
Refills: 0 | Status: ACTIVE | COMMUNITY

## 2020-11-06 NOTE — PHYSICAL EXAM
[Sclera] : the sclera and conjunctiva were normal [Outer Ear] : the ears and nose were normal in appearance [] : no respiratory distress [Heart Rate And Rhythm] : heart rate and rhythm were normal [Normal] : oriented to person, place and time, the affect was normal, the mood was normal and not anxious [de-identified] : Well-healed right lumpectomy scar, no erythema, no LAD, no UE edema rt breasst smaller than left with some telangectasia

## 2020-11-06 NOTE — HISTORY OF PRESENT ILLNESS
[FreeTextEntry1] : Jesica Arguello is a 83 y/o with DCIS of the right breast, ER/PA negative s/p breast conserving therapy and adjuvant radiation therapy from 1/7/15-1/30/15. She is s/p needle biopsy of calcifications in the right breast anterior to the lumpectomy site in 2015, results consistent with changes in the breast related to radiation.  She has been followed with imaging and examination since that time. She has resomed f/u with Dr. March\par \par FU 10/29/20: She returns today for a routine follow up. Mammo done in July 2020. Continues to take Vicodin for chronic back pain.

## 2021-10-04 ENCOUNTER — NON-APPOINTMENT (OUTPATIENT)
Age: 86
End: 2021-10-04

## 2021-10-08 ENCOUNTER — TRANSCRIPTION ENCOUNTER (OUTPATIENT)
Age: 86
End: 2021-10-08

## 2021-10-08 ENCOUNTER — APPOINTMENT (OUTPATIENT)
Dept: ORTHOPEDIC SURGERY | Facility: CLINIC | Age: 86
End: 2021-10-08
Payer: MEDICARE

## 2021-10-08 VITALS
SYSTOLIC BLOOD PRESSURE: 95 MMHG | WEIGHT: 112 LBS | DIASTOLIC BLOOD PRESSURE: 59 MMHG | BODY MASS INDEX: 21.99 KG/M2 | HEIGHT: 60 IN | HEART RATE: 62 BPM

## 2021-10-08 DIAGNOSIS — M25.552 PAIN IN LEFT HIP: ICD-10-CM

## 2021-10-08 DIAGNOSIS — M54.50 LOW BACK PAIN, UNSPECIFIED: ICD-10-CM

## 2021-10-08 PROCEDURE — 99214 OFFICE O/P EST MOD 30 MIN: CPT

## 2021-10-08 PROCEDURE — 72100 X-RAY EXAM L-S SPINE 2/3 VWS: CPT

## 2022-01-04 ENCOUNTER — RX RENEWAL (OUTPATIENT)
Age: 87
End: 2022-01-04

## 2022-01-05 ENCOUNTER — RX RENEWAL (OUTPATIENT)
Age: 87
End: 2022-01-05

## 2022-02-18 ENCOUNTER — RX RENEWAL (OUTPATIENT)
Age: 87
End: 2022-02-18

## 2022-02-18 RX ORDER — MELOXICAM 7.5 MG/1
7.5 TABLET ORAL
Qty: 90 | Refills: 0 | Status: ACTIVE | COMMUNITY
Start: 2021-10-08 | End: 1900-01-01

## 2022-02-27 ENCOUNTER — RX RENEWAL (OUTPATIENT)
Age: 87
End: 2022-02-27

## 2022-02-27 RX ORDER — LIDOCAINE 5% 700 MG/1
5 PATCH TOPICAL
Qty: 30 | Refills: 3 | Status: ACTIVE | COMMUNITY
Start: 2021-10-08 | End: 1900-01-01

## 2022-05-21 NOTE — ED ADULT TRIAGE NOTE - CHIEF COMPLAINT QUOTE
Pt has hx CHF, dx with sinusitis 1 week ago by MD Ibeth Hopper, started on Z-pack.  Pt went back to Dr. Hopper today for no improvement in sx. Pt now c/o worsening SANTOS, SOB. None

## 2023-09-27 ENCOUNTER — NON-APPOINTMENT (OUTPATIENT)
Age: 88
End: 2023-09-27

## 2023-09-28 ENCOUNTER — APPOINTMENT (OUTPATIENT)
Dept: ORTHOPEDIC SURGERY | Facility: CLINIC | Age: 88
End: 2023-09-28
Payer: MEDICARE

## 2023-09-28 VITALS — HEIGHT: 59 IN | BODY MASS INDEX: 20.16 KG/M2 | WEIGHT: 100 LBS

## 2023-09-28 DIAGNOSIS — M41.9 SCOLIOSIS, UNSPECIFIED: ICD-10-CM

## 2023-09-28 DIAGNOSIS — M51.36 OTHER INTERVERTEBRAL DISC DEGENERATION, LUMBAR REGION: ICD-10-CM

## 2023-09-28 DIAGNOSIS — M60.9 MYOSITIS, UNSPECIFIED: ICD-10-CM

## 2023-09-28 DIAGNOSIS — M79.10 MYALGIA, UNSPECIFIED SITE: ICD-10-CM

## 2023-09-28 PROCEDURE — 72100 X-RAY EXAM L-S SPINE 2/3 VWS: CPT

## 2023-09-28 PROCEDURE — 20552 NJX 1/MLT TRIGGER POINT 1/2: CPT

## 2023-09-28 PROCEDURE — 99214 OFFICE O/P EST MOD 30 MIN: CPT | Mod: 25

## 2024-01-30 ENCOUNTER — INPATIENT (INPATIENT)
Facility: HOSPITAL | Age: 89
LOS: 5 days | Discharge: NURSING, REHAB OR LONG-TERM CARE | DRG: 552 | End: 2024-02-05
Attending: INTERNAL MEDICINE | Admitting: STUDENT IN AN ORGANIZED HEALTH CARE EDUCATION/TRAINING PROGRAM
Payer: MEDICARE

## 2024-01-30 VITALS
OXYGEN SATURATION: 94 % | TEMPERATURE: 97 F | DIASTOLIC BLOOD PRESSURE: 71 MMHG | RESPIRATION RATE: 18 BRPM | SYSTOLIC BLOOD PRESSURE: 115 MMHG | HEART RATE: 60 BPM

## 2024-01-30 DIAGNOSIS — S32.9XXA FRACTURE OF UNSPECIFIED PARTS OF LUMBOSACRAL SPINE AND PELVIS, INITIAL ENCOUNTER FOR CLOSED FRACTURE: ICD-10-CM

## 2024-01-30 LAB
ALBUMIN SERPL ELPH-MCNC: 3.6 G/DL — SIGNIFICANT CHANGE UP (ref 3.3–5)
ALP SERPL-CCNC: 65 U/L — SIGNIFICANT CHANGE UP (ref 40–120)
ALT FLD-CCNC: 12 U/L — SIGNIFICANT CHANGE UP (ref 10–45)
ANION GAP SERPL CALC-SCNC: 8 MMOL/L — SIGNIFICANT CHANGE UP (ref 5–17)
APPEARANCE UR: CLEAR — SIGNIFICANT CHANGE UP
APTT BLD: 35.1 SEC — SIGNIFICANT CHANGE UP (ref 24.5–35.6)
AST SERPL-CCNC: 15 U/L — SIGNIFICANT CHANGE UP (ref 10–40)
BASE EXCESS BLDV CALC-SCNC: 11.8 MMOL/L — HIGH (ref -2–3)
BASOPHILS # BLD AUTO: 0.04 K/UL — SIGNIFICANT CHANGE UP (ref 0–0.2)
BASOPHILS NFR BLD AUTO: 0.6 % — SIGNIFICANT CHANGE UP (ref 0–2)
BILIRUB SERPL-MCNC: 0.4 MG/DL — SIGNIFICANT CHANGE UP (ref 0.2–1.2)
BILIRUB UR-MCNC: NEGATIVE — SIGNIFICANT CHANGE UP
BUN SERPL-MCNC: 8 MG/DL — SIGNIFICANT CHANGE UP (ref 7–23)
CALCIUM SERPL-MCNC: 8.7 MG/DL — SIGNIFICANT CHANGE UP (ref 8.4–10.5)
CHLORIDE SERPL-SCNC: 100 MMOL/L — SIGNIFICANT CHANGE UP (ref 96–108)
CK SERPL-CCNC: 48 U/L — SIGNIFICANT CHANGE UP (ref 25–170)
CO2 BLDV-SCNC: 40 MMOL/L — HIGH (ref 22–26)
CO2 SERPL-SCNC: 31 MMOL/L — SIGNIFICANT CHANGE UP (ref 22–31)
COLOR SPEC: YELLOW — SIGNIFICANT CHANGE UP
CREAT SERPL-MCNC: 0.56 MG/DL — SIGNIFICANT CHANGE UP (ref 0.5–1.3)
DIFF PNL FLD: NEGATIVE — SIGNIFICANT CHANGE UP
EGFR: 88 ML/MIN/1.73M2 — SIGNIFICANT CHANGE UP
EOSINOPHIL # BLD AUTO: 0.04 K/UL — SIGNIFICANT CHANGE UP (ref 0–0.5)
EOSINOPHIL NFR BLD AUTO: 0.6 % — SIGNIFICANT CHANGE UP (ref 0–6)
ETHANOL SERPL-MCNC: <10 MG/DL — SIGNIFICANT CHANGE UP (ref 0–10)
GLUCOSE SERPL-MCNC: 102 MG/DL — HIGH (ref 70–99)
GLUCOSE UR QL: NEGATIVE MG/DL — SIGNIFICANT CHANGE UP
HCO3 BLDV-SCNC: 38 MMOL/L — HIGH (ref 22–29)
HCT VFR BLD CALC: 37.8 % — SIGNIFICANT CHANGE UP (ref 34.5–45)
HGB BLD-MCNC: 12.3 G/DL — SIGNIFICANT CHANGE UP (ref 11.5–15.5)
IMM GRANULOCYTES NFR BLD AUTO: 0.6 % — SIGNIFICANT CHANGE UP (ref 0–0.9)
INR BLD: 1.03 RATIO — SIGNIFICANT CHANGE UP (ref 0.85–1.18)
KETONES UR-MCNC: NEGATIVE MG/DL — SIGNIFICANT CHANGE UP
LACTATE SERPL-SCNC: 1.2 MMOL/L — SIGNIFICANT CHANGE UP (ref 0.5–2)
LEUKOCYTE ESTERASE UR-ACNC: NEGATIVE — SIGNIFICANT CHANGE UP
LIDOCAIN IGE QN: 12 U/L — SIGNIFICANT CHANGE UP (ref 7–60)
LYMPHOCYTES # BLD AUTO: 0.67 K/UL — LOW (ref 1–3.3)
LYMPHOCYTES # BLD AUTO: 10 % — LOW (ref 13–44)
MCHC RBC-ENTMCNC: 32.4 PG — SIGNIFICANT CHANGE UP (ref 27–34)
MCHC RBC-ENTMCNC: 32.5 GM/DL — SIGNIFICANT CHANGE UP (ref 32–36)
MCV RBC AUTO: 99.5 FL — SIGNIFICANT CHANGE UP (ref 80–100)
MONOCYTES # BLD AUTO: 0.6 K/UL — SIGNIFICANT CHANGE UP (ref 0–0.9)
MONOCYTES NFR BLD AUTO: 9 % — SIGNIFICANT CHANGE UP (ref 2–14)
NEUTROPHILS # BLD AUTO: 5.28 K/UL — SIGNIFICANT CHANGE UP (ref 1.8–7.4)
NEUTROPHILS NFR BLD AUTO: 79.2 % — HIGH (ref 43–77)
NITRITE UR-MCNC: NEGATIVE — SIGNIFICANT CHANGE UP
NRBC # BLD: 0 /100 WBCS — SIGNIFICANT CHANGE UP (ref 0–0)
PCO2 BLDV: 58 MMHG — HIGH (ref 39–42)
PH BLDV: 7.43 — SIGNIFICANT CHANGE UP (ref 7.32–7.43)
PH UR: 7 — SIGNIFICANT CHANGE UP (ref 5–8)
PLATELET # BLD AUTO: 198 K/UL — SIGNIFICANT CHANGE UP (ref 150–400)
PO2 BLDV: 27 MMHG — SIGNIFICANT CHANGE UP (ref 25–45)
POTASSIUM SERPL-MCNC: 3.5 MMOL/L — SIGNIFICANT CHANGE UP (ref 3.5–5.3)
POTASSIUM SERPL-SCNC: 3.5 MMOL/L — SIGNIFICANT CHANGE UP (ref 3.5–5.3)
PROT SERPL-MCNC: 5.7 G/DL — LOW (ref 6–8.3)
PROT UR-MCNC: NEGATIVE MG/DL — SIGNIFICANT CHANGE UP
PROTHROM AB SERPL-ACNC: 10.8 SEC — SIGNIFICANT CHANGE UP (ref 9.5–13)
RBC # BLD: 3.8 M/UL — SIGNIFICANT CHANGE UP (ref 3.8–5.2)
RBC # FLD: 12.9 % — SIGNIFICANT CHANGE UP (ref 10.3–14.5)
SAO2 % BLDV: 38.9 % — LOW (ref 67–88)
SODIUM SERPL-SCNC: 139 MMOL/L — SIGNIFICANT CHANGE UP (ref 135–145)
SP GR SPEC: >1.03 — HIGH (ref 1–1.03)
UROBILINOGEN FLD QL: 1 MG/DL — SIGNIFICANT CHANGE UP (ref 0.2–1)
WBC # BLD: 6.67 K/UL — SIGNIFICANT CHANGE UP (ref 3.8–10.5)
WBC # FLD AUTO: 6.67 K/UL — SIGNIFICANT CHANGE UP (ref 3.8–10.5)

## 2024-01-30 PROCEDURE — 99285 EMERGENCY DEPT VISIT HI MDM: CPT

## 2024-01-30 PROCEDURE — 70450 CT HEAD/BRAIN W/O DYE: CPT | Mod: 26,MA

## 2024-01-30 PROCEDURE — 71260 CT THORAX DX C+: CPT | Mod: 26,MG

## 2024-01-30 PROCEDURE — 71045 X-RAY EXAM CHEST 1 VIEW: CPT | Mod: 26

## 2024-01-30 PROCEDURE — 74177 CT ABD & PELVIS W/CONTRAST: CPT | Mod: 26,MA

## 2024-01-30 PROCEDURE — 72170 X-RAY EXAM OF PELVIS: CPT | Mod: 26

## 2024-01-30 PROCEDURE — 72125 CT NECK SPINE W/O DYE: CPT | Mod: 26,MA

## 2024-01-30 PROCEDURE — G1004: CPT

## 2024-01-30 PROCEDURE — 73552 X-RAY EXAM OF FEMUR 2/>: CPT | Mod: 26,RT

## 2024-01-30 RX ORDER — SODIUM CHLORIDE 9 MG/ML
250 INJECTION INTRAMUSCULAR; INTRAVENOUS; SUBCUTANEOUS ONCE
Refills: 0 | Status: COMPLETED | OUTPATIENT
Start: 2024-01-30 | End: 2024-01-30

## 2024-01-30 RX ORDER — ACETAMINOPHEN 500 MG
1000 TABLET ORAL ONCE
Refills: 0 | Status: COMPLETED | OUTPATIENT
Start: 2024-01-30 | End: 2024-01-30

## 2024-01-30 RX ORDER — OXYCODONE HYDROCHLORIDE 5 MG/1
10 TABLET ORAL ONCE
Refills: 0 | Status: DISCONTINUED | OUTPATIENT
Start: 2024-01-30 | End: 2024-01-30

## 2024-01-30 RX ORDER — LIDOCAINE 4 G/100G
1 CREAM TOPICAL ONCE
Refills: 0 | Status: COMPLETED | OUTPATIENT
Start: 2024-01-30 | End: 2024-01-30

## 2024-01-30 RX ADMIN — OXYCODONE HYDROCHLORIDE 10 MILLIGRAM(S): 5 TABLET ORAL at 20:26

## 2024-01-30 RX ADMIN — Medication 1000 MILLIGRAM(S): at 20:26

## 2024-01-30 RX ADMIN — OXYCODONE HYDROCHLORIDE 10 MILLIGRAM(S): 5 TABLET ORAL at 19:56

## 2024-01-30 RX ADMIN — SODIUM CHLORIDE 250 MILLILITER(S): 9 INJECTION INTRAMUSCULAR; INTRAVENOUS; SUBCUTANEOUS at 17:48

## 2024-01-30 RX ADMIN — Medication 400 MILLIGRAM(S): at 19:56

## 2024-01-30 RX ADMIN — LIDOCAINE 1 PATCH: 4 CREAM TOPICAL at 19:55

## 2024-01-30 NOTE — ED ADULT NURSE NOTE - OBJECTIVE STATEMENT
89 y/o female PMHx Breast CA s/p radiation, Cataract, COPD current everyday smoker, Endometriosis, Glaucoma, Herniated disc, HTN, Osteoarthritis, Ovarian cyst arrives to [facility] ED by EMS from home with daughter with c/o fall. Pt's daughter provides history, states patient had accidental fall last night, +head strike posterior, denies LOC or a/c use. Patient endorses right sided pain, especially right hip Pt c/o back pain, patient's daughter states back pain chronic. Patient is A&Ox4. Respirations spontaneous and unlabored. Denies SOB, dyspnea, cough, chest pain, palpitations. EKG done. No new abdominal pain, left reducable hernia. No n/v/d. Denies urinary symptoms. Denies fever/chills. No sick contacts. Skin is warm/dry and normal for race, except minor abrasion on back. Ambulates with walker at baseline.

## 2024-01-30 NOTE — ED PROVIDER NOTE - NSICDXPASTMEDICALHX_GEN_ALL_CORE_FT
PAST MEDICAL HISTORY:  Cataract     COPD (chronic obstructive pulmonary disease)     Endometriosis     Glaucoma     Herniated disc     HTN (hypertension)     Osteoarthritis (arthritis due to wear and tear of joints)     Ovarian cyst

## 2024-01-30 NOTE — ED PROVIDER NOTE - PROGRESS NOTE DETAILS
Malick Hurtado PA-C: Received signout on this patient.  Patient presenting complaining of right hip pain and chronic back pain status post fall last night.  CTs show right inferior pubic ramus fracture as well as acute on chronic appearing compression fractures of T5, T11, T12.  Patient follows with spine Dr. Saenz.  Ortho consulted, will see patient in ED. Malick Hurtado PA-C: Pt endorsed to hospitalist for admission. Will admit to Dr. SAAD Apple

## 2024-01-30 NOTE — ED PROVIDER NOTE - CLINICAL SUMMARY MEDICAL DECISION MAKING FREE TEXT BOX
Oscar Mae DO: I have personally performed a face to face medical and diagnostic evaluation of the patient. I have discussed with and reviewed the Resident's and/or ACP's and/or Medical/PA/NP student's note and agree with the History, ROS, Physical Exam and MDM unless otherwise indicated. A brief summary of my personal evaluation and impression can be found below.     89 female history hypertension hyperlipidemia COPD, active smoker, chronic lower back pain presents to the ED after a fall yesterday, patient states she had a mechanical trip and fall and hit the back of her head.  Patient denies AC use.  Patient was able to self ambulate after falling, states she is mostly having back and right hip pain.  Patient was able to ambulate today.  Patient denies recent illness no fevers chills denies any dysuria or hematuria or urinary frequency.  Patient denies bleeding denies any other symptoms at this time.    CONSTITUTIONAL: well-appearing, in NAD  SKIN: Warm dry, normal skin turgor  HEAD: NCAT  EYES: EOMI, PERRLA, no scleral icterus, conjunctiva pink  ENT: oropharynx dry  NECK: Supple; non tender. Full ROM.  CARD: RRR, no murmurs.  RESP: clear to ausculation b/l. No crackles or wheezing.  ABD: Reducible 4 cm midline abdominal hernia inferior to umbilicus, right lower quadrant abdominal tenderness.  MSK: Right upper extremity elbow pain with range of motion no right upper extremity shoulder pain, distal pulses intact, no left upper extremity pain, left lower extremity unremarkable right lower extremity femur tenderness and proximal hip tenderness.  NEURO: normal motor. normal sensory.   PSYCH: Cooperative, appropriate.    Fall yesterday rule out ICH/subdural with CT head, will x-ray extremities causing pain at this time, give pain control symptomatic control, will check for infectious precipitant of symptoms including checking urine check and chest x-ray, patient is otherwise well-appearing has normal vital signs afebrile at this time, suspect that patient will be discharged if imaging unremarkable with dispo pending imaging at this time

## 2024-01-30 NOTE — ED ADULT NURSE NOTE - NSICDXPASTSURGICALHX_GEN_ALL_CORE_FT
PAST SURGICAL HISTORY:  Herniated disc Herniated disc repair    S/P appendectomy 65 years ago    S/P cataract surgery     S/P endometrial ablation     S/P ovarian cystectomy 47 years ago

## 2024-01-30 NOTE — ED PROVIDER NOTE - NSICDXPASTSURGICALHX_GEN_ALL_CORE_FT
PAST SURGICAL HISTORY:  Herniated disc Herniated disc repair    S/P appendectomy 65 years ago    S/P cataract surgery     S/P endometrial ablation     S/P ovarian cystectomy 47 years ago     0

## 2024-01-30 NOTE — ED PROVIDER NOTE - CARE PLAN
1 Principal Discharge DX:	Closed pelvic fracture  Secondary Diagnosis:	Compression fracture of vertebra
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
## 2024-01-31 DIAGNOSIS — R41.0 DISORIENTATION, UNSPECIFIED: ICD-10-CM

## 2024-01-31 DIAGNOSIS — M54.9 DORSALGIA, UNSPECIFIED: ICD-10-CM

## 2024-01-31 DIAGNOSIS — N64.9 DISORDER OF BREAST, UNSPECIFIED: ICD-10-CM

## 2024-01-31 DIAGNOSIS — Z87.09 PERSONAL HISTORY OF OTHER DISEASES OF THE RESPIRATORY SYSTEM: ICD-10-CM

## 2024-01-31 DIAGNOSIS — M48.50XA COLLAPSED VERTEBRA, NOT ELSEWHERE CLASSIFIED, SITE UNSPECIFIED, INITIAL ENCOUNTER FOR FRACTURE: ICD-10-CM

## 2024-01-31 PROCEDURE — 99223 1ST HOSP IP/OBS HIGH 75: CPT

## 2024-01-31 PROCEDURE — 72148 MRI LUMBAR SPINE W/O DYE: CPT | Mod: 26

## 2024-01-31 PROCEDURE — 72146 MRI CHEST SPINE W/O DYE: CPT | Mod: 26

## 2024-01-31 PROCEDURE — 99222 1ST HOSP IP/OBS MODERATE 55: CPT

## 2024-01-31 PROCEDURE — 72128 CT CHEST SPINE W/O DYE: CPT | Mod: 26

## 2024-01-31 PROCEDURE — 72190 X-RAY EXAM OF PELVIS: CPT | Mod: 26

## 2024-01-31 PROCEDURE — 72131 CT LUMBAR SPINE W/O DYE: CPT | Mod: 26

## 2024-01-31 RX ORDER — ROSUVASTATIN CALCIUM 5 MG/1
1 TABLET ORAL
Refills: 0 | DISCHARGE

## 2024-01-31 RX ORDER — ATORVASTATIN CALCIUM 80 MG/1
40 TABLET, FILM COATED ORAL AT BEDTIME
Refills: 0 | Status: DISCONTINUED | OUTPATIENT
Start: 2024-01-31 | End: 2024-02-05

## 2024-01-31 RX ORDER — ACETAMINOPHEN 500 MG
575 TABLET ORAL ONCE
Refills: 0 | Status: COMPLETED | OUTPATIENT
Start: 2024-01-31 | End: 2024-01-31

## 2024-01-31 RX ORDER — BUDESONIDE AND FORMOTEROL FUMARATE DIHYDRATE 160; 4.5 UG/1; UG/1
2 AEROSOL RESPIRATORY (INHALATION)
Refills: 0 | Status: DISCONTINUED | OUTPATIENT
Start: 2024-01-31 | End: 2024-02-05

## 2024-01-31 RX ORDER — DILTIAZEM HCL 120 MG
120 CAPSULE, EXT RELEASE 24 HR ORAL DAILY
Refills: 0 | Status: DISCONTINUED | OUTPATIENT
Start: 2024-01-31 | End: 2024-02-05

## 2024-01-31 RX ORDER — BUDESONIDE AND FORMOTEROL FUMARATE DIHYDRATE 160; 4.5 UG/1; UG/1
2 AEROSOL RESPIRATORY (INHALATION)
Qty: 0 | Refills: 0 | DISCHARGE

## 2024-01-31 RX ORDER — ENOXAPARIN SODIUM 100 MG/ML
40 INJECTION SUBCUTANEOUS EVERY 24 HOURS
Refills: 0 | Status: DISCONTINUED | OUTPATIENT
Start: 2024-01-31 | End: 2024-02-05

## 2024-01-31 RX ORDER — ACETAMINOPHEN 500 MG
1000 TABLET ORAL ONCE
Refills: 0 | Status: DISCONTINUED | OUTPATIENT
Start: 2024-01-31 | End: 2024-01-31

## 2024-01-31 RX ORDER — ALBUTEROL 90 UG/1
2 AEROSOL, METERED ORAL
Qty: 0 | Refills: 0 | DISCHARGE

## 2024-01-31 RX ORDER — DULOXETINE HYDROCHLORIDE 30 MG/1
30 CAPSULE, DELAYED RELEASE ORAL DAILY
Refills: 0 | Status: DISCONTINUED | OUTPATIENT
Start: 2024-01-31 | End: 2024-02-05

## 2024-01-31 RX ORDER — GABAPENTIN 400 MG/1
300 CAPSULE ORAL ONCE
Refills: 0 | Status: COMPLETED | OUTPATIENT
Start: 2024-01-31 | End: 2024-01-31

## 2024-01-31 RX ORDER — DULOXETINE HYDROCHLORIDE 30 MG/1
1 CAPSULE, DELAYED RELEASE ORAL
Refills: 0 | DISCHARGE

## 2024-01-31 RX ORDER — OXYCODONE AND ACETAMINOPHEN 5; 325 MG/1; MG/1
1 TABLET ORAL
Refills: 0 | DISCHARGE

## 2024-01-31 RX ORDER — GABAPENTIN 400 MG/1
1 CAPSULE ORAL
Refills: 0 | DISCHARGE

## 2024-01-31 RX ORDER — GABAPENTIN 400 MG/1
600 CAPSULE ORAL AT BEDTIME
Refills: 0 | Status: DISCONTINUED | OUTPATIENT
Start: 2024-02-01 | End: 2024-02-05

## 2024-01-31 RX ORDER — BUPRENORPHINE 10 UG/H
1 PATCH, EXTENDED RELEASE TRANSDERMAL
Qty: 0 | Refills: 0 | DISCHARGE

## 2024-01-31 RX ORDER — GABAPENTIN 400 MG/1
300 CAPSULE ORAL
Refills: 0 | Status: DISCONTINUED | OUTPATIENT
Start: 2024-01-31 | End: 2024-01-31

## 2024-01-31 RX ORDER — HYDROCODONE BITARTRATE AND ACETAMINOPHEN 7.5; 325 MG/15ML; MG/15ML
1 SOLUTION ORAL
Qty: 0 | Refills: 0 | DISCHARGE

## 2024-01-31 RX ADMIN — DULOXETINE HYDROCHLORIDE 30 MILLIGRAM(S): 30 CAPSULE, DELAYED RELEASE ORAL at 06:13

## 2024-01-31 RX ADMIN — BUDESONIDE AND FORMOTEROL FUMARATE DIHYDRATE 2 PUFF(S): 160; 4.5 AEROSOL RESPIRATORY (INHALATION) at 06:12

## 2024-01-31 RX ADMIN — ENOXAPARIN SODIUM 40 MILLIGRAM(S): 100 INJECTION SUBCUTANEOUS at 06:16

## 2024-01-31 RX ADMIN — Medication 120 MILLIGRAM(S): at 06:12

## 2024-01-31 RX ADMIN — GABAPENTIN 300 MILLIGRAM(S): 400 CAPSULE ORAL at 21:43

## 2024-01-31 RX ADMIN — ATORVASTATIN CALCIUM 40 MILLIGRAM(S): 80 TABLET, FILM COATED ORAL at 21:44

## 2024-01-31 RX ADMIN — BUDESONIDE AND FORMOTEROL FUMARATE DIHYDRATE 2 PUFF(S): 160; 4.5 AEROSOL RESPIRATORY (INHALATION) at 17:09

## 2024-01-31 RX ADMIN — GABAPENTIN 300 MILLIGRAM(S): 400 CAPSULE ORAL at 06:12

## 2024-01-31 RX ADMIN — LIDOCAINE 1 PATCH: 4 CREAM TOPICAL at 07:00

## 2024-01-31 RX ADMIN — Medication 575 MILLIGRAM(S): at 03:09

## 2024-01-31 RX ADMIN — Medication 230 MILLIGRAM(S): at 02:39

## 2024-01-31 NOTE — H&P ADULT - PROBLEM SELECTOR PLAN 1
- presumably mechanical  - PT/OT eval  - fall precautions  - tlso brace as per ortho recs  - monitor for infection

## 2024-01-31 NOTE — H&P ADULT - NSHPPHYSICALEXAM_GEN_ALL_CORE
PHYSICAL EXAM:   GENERAL: Alert. +confused. No acute distress. +thin. Not cachectic. Not obese.  HEAD:  Atraumatic. Normocephalic.  EYES: EOMI. PERRLA. Normal conjunctiva/sclera.  ENT: Neck supple. No JVD. Moist oral mucosa. Not edentulous. No thrush.  LYMPH: Normal supraclavicular/cervical lymph nodes.   CARDIAC: Not tachy, Not raoul. Regular rhythm. Not irregularly irregular. S1. S2. No murmur. No rub. No distant heart sounds.  LUNG/CHEST: CTAB. BS equal bilaterally. No wheezes. No rales. No rhonchi.  ABDOMEN: Soft. No tenderness. No distension. No fluid wave. Normal bowel sounds.  BACK: No midline/vertebral tenderness. No flank tenderness.  VASCULAR: +2 b/l radial or ulnar pulses. Palpable DP pulses.  EXTREMITIES:  No clubbing. No cyanosis. No edema. Moving all 4.  NEUROLOGY: A&Ox1. Non-focal exam. Cranial nerves intact. Mildly tangential and repetitive speech. Sensation intact.  PSYCH: Inappropriate behavior. Anxious affect.  SKIN: No jaundice. No erythema. No rash/lesion.  Vital Signs Last 24 Hrs  T(C): 36.6 (31 Jan 2024 05:01), Max: 36.8 (31 Jan 2024 00:44)  T(F): 97.9 (31 Jan 2024 05:01), Max: 98.2 (31 Jan 2024 00:44)  HR: 75 (31 Jan 2024 05:01) (60 - 79)  BP: 146/67 (31 Jan 2024 05:01) (108/52 - 146/67)  BP(mean): --  ABP: --  ABP(mean): --  RR: 18 (31 Jan 2024 05:01) (18 - 18)  SpO2: 95% (31 Jan 2024 05:01) (93% - 95%)    O2 Parameters below as of 31 Jan 2024 05:01  Patient On (Oxygen Delivery Method): room air          I&O's Summary

## 2024-01-31 NOTE — H&P ADULT - PROBLEM/PLAN-3
[Home] : at home, [unfilled] , at the time of the visit. [Verbal consent obtained from patient] : the patient, [unfilled] DISPLAY PLAN FREE TEXT

## 2024-01-31 NOTE — CONSULT NOTE ADULT - SUBJECTIVE AND OBJECTIVE BOX
Orthopedic Spine Consult Note    Patient is a 89y Female hx COPD, cataract, endometriosis who presents with LBP and mild R hip s/p MF while in her bedroom yesterday. Pt was able to ambulate after falling. Denies HS/LOC. Denies pain/injury elsewhere. Denies numbness/tingling/paresthesias/weakness. Denies bowel/bladder incontinence. Denies fevers/chills. No other complaints at this time.    HEALTH ISSUES - PROBLEM Dx:          MEDICATIONS  (STANDING):      Allergies    No Known Allergies    Intolerances        PAST MEDICAL & SURGICAL HISTORY:  HTN (hypertension)    Glaucoma    Cataract    Herniated disc    Osteoarthritis (arthritis due to wear and tear of joints)    Endometriosis    Ovarian cyst    Left ventricular dysfunction    COPD (chronic obstructive pulmonary disease)    S/P cataract surgery    S/P appendectomy  65 years ago    S/P ovarian cystectomy  47 years ago    Herniated disc  Herniated disc repair    S/P endometrial ablation                              12.3   6.67  )-----------( 198      ( 30 Jan 2024 17:52 )             37.8       30 Jan 2024 17:52    139    |  100    |  8      ----------------------------<  102    3.5     |  31     |  0.56     Ca    8.7        30 Jan 2024 17:52    TPro  5.7    /  Alb  3.6    /  TBili  0.4    /  DBili  x      /  AST  15     /  ALT  12     /  AlkPhos  65     30 Jan 2024 17:52      PT/INR - ( 30 Jan 2024 17:52 )   PT: 10.8 sec;   INR: 1.03 ratio         PTT - ( 30 Jan 2024 17:52 )  PTT:35.1 sec    Urinalysis Basic - ( 30 Jan 2024 21:57 )    Color: Yellow / Appearance: Clear / SG: >1.030 / pH: x  Gluc: x / Ketone: Negative mg/dL  / Bili: Negative / Urobili: 1.0 mg/dL   Blood: x / Protein: Negative mg/dL / Nitrite: Negative   Leuk Esterase: Negative / RBC: x / WBC x   Sq Epi: x / Non Sq Epi: x / Bacteria: x        Vital Signs Last 24 Hrs  T(C): 36.6 (01-30-24 @ 22:25), Max: 36.6 (01-30-24 @ 19:58)  T(F): 97.9 (01-30-24 @ 22:25), Max: 97.9 (01-30-24 @ 22:25)  HR: 79 (01-30-24 @ 22:25) (60 - 79)  BP: 108/52 (01-30-24 @ 22:25) (108/52 - 115/71)  BP(mean): --  RR: 18 (01-30-24 @ 22:25) (18 - 18)  SpO2: 93% (01-30-24 @ 22:25) (93% - 95%)      Physical exam  Gen: NAD  Resp: no increased WOB on RA  Spine PE:  Skin intact  No gross deformity  Midline TTP over L/S spine  No bony step offs  Negative clonus  Negative babinski  Negative jacob  No saddle anesthesia    Motor:                   C5                C6              C7               C8           T1   R            5/5                5/5            5/5             5/5          5/5  L             5/5               5/5             5/5             5/5          5/5                L2             L3             L4               L5            S1  R         5/5           5/5          5/5             5/5           5/5  L          5/5          5/5           5/5             5/5           5/5    Sensory:            C5         C6         C7      C8       T1        (0=absent, 1=impaired, 2=normal, NT=not testable)  R         2            2           2        2         2  L          2            2           2        2         2               L2          L3         L4      L5       S1         (0=absent, 1=impaired, 2=normal, NT=not testable)  R         2            2            2        2        2  L          2            2           2        2         2    Imaging:   CT A/P w acute fx R inf pubic rami, T5/T11 and 12 acute on chronic VCF w minimal retropulsion    A/P: 89y Female with acute fx R inf pubic rami, T5/T11 and 12 acute on chronic VCF s/p MF    Pain control  WBAT with TLSO brace for comfort  f/u MR T/Lsp  f/u outpatient w Dr. Saenz in 1-2 weeks Orthopedic Spine Consult Note    Patient is a 89y Female hx COPD, cataract, endometriosis who presents with LBP and mild R hip s/p MF while in her bedroom yesterday. Pt was able to ambulate after falling. Endorses HS w no LOC. Denies pain/injury elsewhere. Denies numbness/tingling/paresthesias/weakness. Denies bowel/bladder incontinence. Denies fevers/chills. No other complaints at this time.    HEALTH ISSUES - PROBLEM Dx:          MEDICATIONS  (STANDING):      Allergies    No Known Allergies    Intolerances        PAST MEDICAL & SURGICAL HISTORY:  HTN (hypertension)    Glaucoma    Cataract    Herniated disc    Osteoarthritis (arthritis due to wear and tear of joints)    Endometriosis    Ovarian cyst    Left ventricular dysfunction    COPD (chronic obstructive pulmonary disease)    S/P cataract surgery    S/P appendectomy  65 years ago    S/P ovarian cystectomy  47 years ago    Herniated disc  Herniated disc repair    S/P endometrial ablation                              12.3   6.67  )-----------( 198      ( 30 Jan 2024 17:52 )             37.8       30 Jan 2024 17:52    139    |  100    |  8      ----------------------------<  102    3.5     |  31     |  0.56     Ca    8.7        30 Jan 2024 17:52    TPro  5.7    /  Alb  3.6    /  TBili  0.4    /  DBili  x      /  AST  15     /  ALT  12     /  AlkPhos  65     30 Jan 2024 17:52      PT/INR - ( 30 Jan 2024 17:52 )   PT: 10.8 sec;   INR: 1.03 ratio         PTT - ( 30 Jan 2024 17:52 )  PTT:35.1 sec    Urinalysis Basic - ( 30 Jan 2024 21:57 )    Color: Yellow / Appearance: Clear / SG: >1.030 / pH: x  Gluc: x / Ketone: Negative mg/dL  / Bili: Negative / Urobili: 1.0 mg/dL   Blood: x / Protein: Negative mg/dL / Nitrite: Negative   Leuk Esterase: Negative / RBC: x / WBC x   Sq Epi: x / Non Sq Epi: x / Bacteria: x        Vital Signs Last 24 Hrs  T(C): 36.6 (01-30-24 @ 22:25), Max: 36.6 (01-30-24 @ 19:58)  T(F): 97.9 (01-30-24 @ 22:25), Max: 97.9 (01-30-24 @ 22:25)  HR: 79 (01-30-24 @ 22:25) (60 - 79)  BP: 108/52 (01-30-24 @ 22:25) (108/52 - 115/71)  BP(mean): --  RR: 18 (01-30-24 @ 22:25) (18 - 18)  SpO2: 93% (01-30-24 @ 22:25) (93% - 95%)      Physical exam  Gen: NAD  Resp: no increased WOB on RA  Spine PE:  Skin intact  No gross deformity  Midline TTP over L/S spine  No bony step offs  Negative clonus  Negative babinski  Negative jacob  No saddle anesthesia    Motor:                   C5                C6              C7               C8           T1   R            5/5                5/5            5/5             5/5          5/5  L             5/5               5/5             5/5             5/5          5/5                L2             L3             L4               L5            S1  R         5/5           5/5          5/5             5/5           5/5  L          5/5          5/5           5/5             5/5           5/5    Sensory:            C5         C6         C7      C8       T1        (0=absent, 1=impaired, 2=normal, NT=not testable)  R         2            2           2        2         2  L          2            2           2        2         2               L2          L3         L4      L5       S1         (0=absent, 1=impaired, 2=normal, NT=not testable)  R         2            2            2        2        2  L          2            2           2        2         2    Imaging:   CT A/P w acute fx R inf pubic rami, T5/T11 and 12 acute on chronic VCF w minimal retropulsion    A/P: 89y Female with acute fx R inf pubic rami, T5/T11 and 12 acute on chronic VCF s/p MF    Pain control  WBAT with TLSO brace for comfort  f/u MR T/Lsp  f/u outpatient w Dr. Saenz in 1-2 weeks Orthopedic Spine Consult Note    Patient is a 89y Female hx COPD, cataract, endometriosis who presents with LBP and mild R hip s/p MF while in her bedroom yesterday. Pt was able to ambulate after falling. Endorses HS w no LOC. Denies pain/injury elsewhere. Denies numbness/tingling/paresthesias/weakness. Denies bowel/bladder incontinence. Denies fevers/chills. No other complaints at this time.    HEALTH ISSUES - PROBLEM Dx:          MEDICATIONS  (STANDING):      Allergies    No Known Allergies    Intolerances        PAST MEDICAL & SURGICAL HISTORY:  HTN (hypertension)    Glaucoma    Cataract    Herniated disc    Osteoarthritis (arthritis due to wear and tear of joints)    Endometriosis    Ovarian cyst    Left ventricular dysfunction    COPD (chronic obstructive pulmonary disease)    S/P cataract surgery    S/P appendectomy  65 years ago    S/P ovarian cystectomy  47 years ago    Herniated disc  Herniated disc repair    S/P endometrial ablation                              12.3   6.67  )-----------( 198      ( 30 Jan 2024 17:52 )             37.8       30 Jan 2024 17:52    139    |  100    |  8      ----------------------------<  102    3.5     |  31     |  0.56     Ca    8.7        30 Jan 2024 17:52    TPro  5.7    /  Alb  3.6    /  TBili  0.4    /  DBili  x      /  AST  15     /  ALT  12     /  AlkPhos  65     30 Jan 2024 17:52      PT/INR - ( 30 Jan 2024 17:52 )   PT: 10.8 sec;   INR: 1.03 ratio         PTT - ( 30 Jan 2024 17:52 )  PTT:35.1 sec    Urinalysis Basic - ( 30 Jan 2024 21:57 )    Color: Yellow / Appearance: Clear / SG: >1.030 / pH: x  Gluc: x / Ketone: Negative mg/dL  / Bili: Negative / Urobili: 1.0 mg/dL   Blood: x / Protein: Negative mg/dL / Nitrite: Negative   Leuk Esterase: Negative / RBC: x / WBC x   Sq Epi: x / Non Sq Epi: x / Bacteria: x        Vital Signs Last 24 Hrs  T(C): 36.6 (01-30-24 @ 22:25), Max: 36.6 (01-30-24 @ 19:58)  T(F): 97.9 (01-30-24 @ 22:25), Max: 97.9 (01-30-24 @ 22:25)  HR: 79 (01-30-24 @ 22:25) (60 - 79)  BP: 108/52 (01-30-24 @ 22:25) (108/52 - 115/71)  BP(mean): --  RR: 18 (01-30-24 @ 22:25) (18 - 18)  SpO2: 93% (01-30-24 @ 22:25) (93% - 95%)      Physical exam  Gen: NAD  Resp: no increased WOB on RA  Spine PE:  Skin intact  No gross deformity  Midline TTP over L/S spine  No bony step offs  Negative clonus  Negative babinski  Negative jacob  No saddle anesthesia    Motor:                   C5                C6              C7               C8           T1   R            5/5                5/5            5/5             5/5          5/5  L             5/5               5/5             5/5             5/5          5/5                L2             L3             L4               L5            S1  R         5/5           5/5          5/5             5/5           5/5  L          5/5          5/5           5/5             5/5           5/5    Sensory:            C5         C6         C7      C8       T1        (0=absent, 1=impaired, 2=normal, NT=not testable)  R         2            2           2        2         2  L          2            2           2        2         2               L2          L3         L4      L5       S1         (0=absent, 1=impaired, 2=normal, NT=not testable)  R         2            2            2        2        2  L          2            2           2        2         2    Imaging:   CT A/P w acute fx R inf pubic rami, T5/T11 and 12 acute on chronic VCF w minimal retropulsion    A/P: 89y Female with acute fx R inf pubic rami, T5/T11 and 12 acute on chronic VCF s/p MF    Pain control  WBAT with TLSO brace for comfort  Recommend serial CBC for 48h to ensure no ongoing bleeding; there is a risk of hematoma formation if therapeutic anticoagulation is started  PT  f/u CT T/Lsp, can also obtain MRI  f/u outpatient w Dr. Saenz and Dr. Churchill in 1-2 weeks

## 2024-01-31 NOTE — PROGRESS NOTE ADULT - SUBJECTIVE AND OBJECTIVE BOX
Patient seen and examined at bedside. Pain well controlled with medication. Patient denies any numbness, tingling, weakness, or any other orthopaedic complaint.     Exam:   T(C): 36.9 (01-31-24 @ 15:59), Max: 36.9 (01-31-24 @ 15:59)  T(F): 98.4 (01-31-24 @ 15:59), Max: 98.4 (01-31-24 @ 15:59)  HR: 78 (01-31-24 @ 15:59) (65 - 79)  BP: 125/59 (01-31-24 @ 15:59) (108/52 - 146/67)  RR: 18 (01-31-24 @ 15:59) (18 - 18)  SpO2: 95% (01-31-24 @ 15:59) (93% - 95%)      Gen: NAD  Resp: no increased WOB on RA  Spine PE:  Skin intact  No gross deformity  Midline TTP over L/S spine  No bony step offs  Negative clonus  Negative babinski  Negative jacob  No saddle anesthesia    Motor:                   C5                C6              C7               C8           T1   R            5/5                5/5            5/5             5/5          5/5  L             5/5               5/5             5/5             5/5          5/5                L2             L3             L4               L5            S1  R         5/5           5/5          5/5             5/5           5/5  L          5/5          5/5           5/5             5/5           5/5    Sensory:            C5         C6         C7      C8       T1        (0=absent, 1=impaired, 2=normal, NT=not testable)  R         2            2           2        2         2  L          2            2           2        2         2               L2          L3         L4      L5       S1         (0=absent, 1=impaired, 2=normal, NT=not testable)  R         2            2            2        2        2  L          2            2           2        2         2        Laboratory Results:   CBC, 01-30-24 @ 17:52    WBC: 6.67  Hgb: 12.3  Hct: 37.8  Plt: 198      Chemistry, 01-30-24 @ 17:52    Na: 139     AST: 15  K: 3.5       ALT: 12  Cl: 100      TProt: 5.7  CO2: 31     Alb: 3.6  BUN: 8     TBili: 0.4  Cr: 0.56      AP: 65  Glu: 102       Mg: --  P: --    eGFR: --  eGFR, AA: --        Imaging: XR, CT, MR reviewed.

## 2024-01-31 NOTE — H&P ADULT - NSHPREVIEWOFSYSTEMS_GEN_ALL_CORE
REVIEW OF SYSTEMS:  CONSTITUTIONAL: No weakness. No fevers. No chills. No rigors. No poor appetite.  EYES: No blurry or double vision. No eye pain.  ENT: No hearing difficulty. No sore throat. No Sinusitis/rhinorrhea.   NECK: No pain. No stiffness/rigidity.  CARDIAC: No chest pain. No palpitations. No lightheadedness. No syncope.  RESPIRATORY: No cough. No SOB.   GASTROINTESTINAL: No abdominal pain. +nausea. +vomiting. +diarrhea. No constipation.   GENITOURINARY: No dysuria. No frequency. No oliguria.  NEUROLOGICAL: No numbness/tingling. No focal weakness. No headache. +unsteady gait.  BACK: +back pain. No flank pain.  EXTREMITIES: No lower extremity edema. Full ROM.   SKIN: No rashes. No itching. No other lesions.  All other review of systems is negative unless indicated above.  Unless indicated above, unable to assess ROS 2/2

## 2024-01-31 NOTE — PROGRESS NOTE ADULT - ASSESSMENT
A/P: 89y Female with acute fx R inf pubic rami, T5/T11 and 12 acute on chronic VCF s/p MF    Pain control  WBAT with TLSO brace for comfort  Recommend serial CBC for 48h to ensure no ongoing bleeding; there is a risk of hematoma formation if therapeutic anticoagulation is started  PT  f/u CT T/Lsp, can also obtain MRI  f/u outpatient w Dr. Saenz and Dr. Churchill in 1-2 weeks

## 2024-01-31 NOTE — H&P ADULT - NSHPSOCIALHISTORY_GEN_ALL_CORE
Social History:    Marital Status: (  ) , (  ) Single, (  ) , (  x) , (  )   # of Children: 3  Lives with: ( x ) alone, (  ) children, (  ) spouse, (  ) parents, (  ) siblings, (  ) friends, (  ) other:   Occupation:     Substance Use/Illicit Drugs: (  ) never used vs other:   Tobacco Usage: (  ) never smoked, (  ) former smoker, ( x ) current smoker and Total Pack-Years:   Last Alcohol Usage/Frequency/Amount/Withdrawal/Hx of Abuse:  6 mo ago  Foreign travel:   Animal exposure:

## 2024-01-31 NOTE — H&P ADULT - PROBLEM SELECTOR PLAN 4
- need to have GOC conversation with pt when more lucid and family  - otherwise f/u pmd/obgyn as outpt

## 2024-01-31 NOTE — H&P ADULT - NSHPLABSRESULTS_GEN_ALL_CORE
Personally reviewed old records.  Personally reviewed labs.  Personally reviewed imaging.                          12.3   6.67  )-----------( 198      ( 30 Jan 2024 17:52 )             37.8       01-30    139  |  100  |  8   ----------------------------<  102<H>  3.5   |  31  |  0.56    Ca    8.7      30 Jan 2024 17:52    TPro  5.7<L>  /  Alb  3.6  /  TBili  0.4  /  DBili  x   /  AST  15  /  ALT  12  /  AlkPhos  65  01-30      CARDIAC MARKERS ( 30 Jan 2024 17:52 )  x     / x     / 48 U/L / x     / x            LIVER FUNCTIONS - ( 30 Jan 2024 17:52 )  Alb: 3.6 g/dL / Pro: 5.7 g/dL / ALK PHOS: 65 U/L / ALT: 12 U/L / AST: 15 U/L / GGT: x             PT/INR - ( 30 Jan 2024 17:52 )   PT: 10.8 sec;   INR: 1.03 ratio         PTT - ( 30 Jan 2024 17:52 )  PTT:35.1 sec    Urinalysis Basic - ( 30 Jan 2024 21:57 )    Color: Yellow / Appearance: Clear / SG: >1.030 / pH: x  Gluc: x / Ketone: Negative mg/dL  / Bili: Negative / Urobili: 1.0 mg/dL   Blood: x / Protein: Negative mg/dL / Nitrite: Negative   Leuk Esterase: Negative / RBC: x / WBC x   Sq Epi: x / Non Sq Epi: x / Bacteria: x

## 2024-01-31 NOTE — H&P ADULT - ASSESSMENT
89F c hx COPD, current smoker, pw fall, found to have multiple new thoracic compression fractures, and now mildly confused

## 2024-01-31 NOTE — H&P ADULT - HISTORY OF PRESENT ILLNESS
89F c hx COPD, current smoker, pw fall, and back pain.    Pt moderately confused at time of interview, A&Ox1, trying to climb out of bed, but answering most questions appropriately.  Pt states 2 days ago, she was in her house, trying to take her jacket off a hook and fell backwards onto the ground and hitting her head on a chair. Pt states she was able to get up afterwards and sit on her bed. Pt states she then called her daughter.  Pt lives alone and the fall was unwitnessed. Pt currently reports back pain. Also states she had nausea, vomiting, and diarrhea at some point, but can't say when. Otherwise denies any other symptoms, other than that she had a very bad night as she was unable to sleep due to being in the hallway.

## 2024-02-01 LAB
HCT VFR BLD CALC: 36.1 % — SIGNIFICANT CHANGE UP (ref 34.5–45)
HGB BLD-MCNC: 12.3 G/DL — SIGNIFICANT CHANGE UP (ref 11.5–15.5)
MCHC RBC-ENTMCNC: 33 PG — SIGNIFICANT CHANGE UP (ref 27–34)
MCHC RBC-ENTMCNC: 34.1 GM/DL — SIGNIFICANT CHANGE UP (ref 32–36)
MCV RBC AUTO: 96.8 FL — SIGNIFICANT CHANGE UP (ref 80–100)
NRBC # BLD: 0 /100 WBCS — SIGNIFICANT CHANGE UP (ref 0–0)
PLATELET # BLD AUTO: 201 K/UL — SIGNIFICANT CHANGE UP (ref 150–400)
RBC # BLD: 3.73 M/UL — LOW (ref 3.8–5.2)
RBC # FLD: 12.7 % — SIGNIFICANT CHANGE UP (ref 10.3–14.5)
SARS-COV-2 RNA SPEC QL NAA+PROBE: SIGNIFICANT CHANGE UP
WBC # BLD: 6.66 K/UL — SIGNIFICANT CHANGE UP (ref 3.8–10.5)
WBC # FLD AUTO: 6.66 K/UL — SIGNIFICANT CHANGE UP (ref 3.8–10.5)

## 2024-02-01 RX ORDER — SENNA PLUS 8.6 MG/1
2 TABLET ORAL AT BEDTIME
Refills: 0 | Status: DISCONTINUED | OUTPATIENT
Start: 2024-02-01 | End: 2024-02-05

## 2024-02-01 RX ORDER — POLYETHYLENE GLYCOL 3350 17 G/17G
17 POWDER, FOR SOLUTION ORAL DAILY
Refills: 0 | Status: DISCONTINUED | OUTPATIENT
Start: 2024-02-01 | End: 2024-02-05

## 2024-02-01 RX ORDER — CHLORHEXIDINE GLUCONATE 213 G/1000ML
1 SOLUTION TOPICAL
Refills: 0 | Status: DISCONTINUED | OUTPATIENT
Start: 2024-02-01 | End: 2024-02-05

## 2024-02-01 RX ADMIN — GABAPENTIN 600 MILLIGRAM(S): 400 CAPSULE ORAL at 21:16

## 2024-02-01 RX ADMIN — BUDESONIDE AND FORMOTEROL FUMARATE DIHYDRATE 2 PUFF(S): 160; 4.5 AEROSOL RESPIRATORY (INHALATION) at 17:37

## 2024-02-01 RX ADMIN — POLYETHYLENE GLYCOL 3350 17 GRAM(S): 17 POWDER, FOR SOLUTION ORAL at 17:40

## 2024-02-01 RX ADMIN — Medication 120 MILLIGRAM(S): at 05:37

## 2024-02-01 RX ADMIN — DULOXETINE HYDROCHLORIDE 30 MILLIGRAM(S): 30 CAPSULE, DELAYED RELEASE ORAL at 11:20

## 2024-02-01 RX ADMIN — ENOXAPARIN SODIUM 40 MILLIGRAM(S): 100 INJECTION SUBCUTANEOUS at 05:38

## 2024-02-01 RX ADMIN — ATORVASTATIN CALCIUM 40 MILLIGRAM(S): 80 TABLET, FILM COATED ORAL at 21:16

## 2024-02-01 RX ADMIN — BUDESONIDE AND FORMOTEROL FUMARATE DIHYDRATE 2 PUFF(S): 160; 4.5 AEROSOL RESPIRATORY (INHALATION) at 05:38

## 2024-02-01 RX ADMIN — SENNA PLUS 2 TABLET(S): 8.6 TABLET ORAL at 21:16

## 2024-02-01 NOTE — CONSULT NOTE ADULT - SUBJECTIVE AND OBJECTIVE BOX
[facility]AT[facility]/ONCOLOGY CONSULT NOTE     HPI:  Patient is a 89y Female with PMHx of DCIS s/p lumpectomy and RT in 2014, severe COPD, actively smoking, depression, HTN, LBBB, macular degeneration, osteopenia, chronic pain syndrome, lumbar radiculopathy, abdominal hernia, B12 deficiency, LE edema, dyslipidemia who is now admitted s/p mechanical fall and AMS and now found to have acute R inferior pubic rami fracture and T5/T11 and 12 acute on chronic vertebral compression fracture without acute intracranial disease. Orthopedic surgery was consulted, no surgical intervention planned at this time.     Patient last had a mammogram 2021 which was normal. she has a hx of DCIS s/p lumpectomy and RT, records need to be reviewed. Outpatient goals seem to be conservative management given age, as all subsequent screening modalities have been discontinues. CT Chest here now shows a cystic lesion in the right breast measures up to 3.2 cm without obvious LAD otherwise.      ROS: pertinent positives and negatives as per HPI    Allergies: No Known Allergies      PMHx:  HTN (hypertension)  Glaucoma  Cataract  Herniated disc  Osteoarthritis (arthritis due to wear and tear of joints)  Endometriosis  Ovarian cyst  Left ventricular dysfunction  COPD (chronic obstructive pulmonary disease)    SurgHx:   S/P cataract surgery  S/P appendectomy  S/P ovarian cystectomy  Herniated disc  S/P endometrial ablation    FHx:   No pertinent family history in first degree relatives      SocHx:   Current smoker, lives at home     Meds:   MEDICATIONS  (STANDING):  atorvastatin 40 milliGRAM(s) Oral at bedtime  budesonide 160 MICROgram(s)/formoterol 4.5 MICROgram(s) Inhaler 2 Puff(s) Inhalation two times a day  diltiazem    milliGRAM(s) Oral daily  DULoxetine 30 milliGRAM(s) Oral daily  enoxaparin Injectable 40 milliGRAM(s) SubCutaneous every 24 hours  gabapentin 600 milliGRAM(s) Oral at bedtime    MEDICATIONS  (PRN):  oxycodone    5 mG/acetaminophen 325 mG 2 Tablet(s) Oral every 4 hours PRN mod to severe pain    Vital Signs  T(C): 36.8 (02-01-24 @ 04:50), Max: 37.2 (01-31-24 @ 22:02)  T(F): 98.3 (02-01-24 @ 04:50), Max: 99 (01-31-24 @ 22:02)  HR: 63 (02-01-24 @ 04:50) (63 - 79)  BP: 154/77 (02-01-24 @ 04:50) (125/59 - 154/77)  RR: 18 (02-01-24 @ 04:50) (17 - 18)  SpO2: 95% (02-01-24 @ 04:50) (94% - 95%)    Physical Exam:  Gen:   HEENT:   Chest:   Cardiac:  Abd:   Ext:   Neuro:   Integument:     Labs:                        12.3   6.67  )-----------( 198      ( 30 Jan 2024 17:52 )             37.8     CBC Full  -  ( 30 Jan 2024 17:52 )  WBC Count : 6.67 K/uL  RBC Count : 3.80 M/uL  Hemoglobin : 12.3 g/dL  Hematocrit : 37.8 %  Platelet Count - Automated : 198 K/uL  Mean Cell Volume : 99.5 fl  Mean Cell Hemoglobin : 32.4 pg  Mean Cell Hemoglobin Concentration : 32.5 gm/dL  Auto Neutrophil # : 5.28 K/uL  Auto Lymphocyte # : 0.67 K/uL  Auto Monocyte # : 0.60 K/uL  Auto Eosinophil # : 0.04 K/uL  Auto Basophil # : 0.04 K/uL  Auto Neutrophil % : 79.2 %  Auto Lymphocyte % : 10.0 %  Auto Monocyte % : 9.0 %  Auto Eosinophil % : 0.6 %  Auto Basophil % : 0.6 %    01-30    139  |  100  |  8   ----------------------------<  102<H>  3.5   |  31  |  0.56    Ca    8.7      30 Jan 2024 17:52    TPro  5.7<L>  /  Alb  3.6  /  TBili  0.4  /  DBili  x   /  AST  15  /  ALT  12  /  AlkPhos  65  01-30    PT/INR - ( 30 Jan 2024 17:52 )   PT: 10.8 sec;   INR: 1.03 ratio         PTT - ( 30 Jan 2024 17:52 )  PTT:35.1 sec   [facility]/ONCOLOGY CONSULT NOTE     HPI:  Patient is a 89y Female with PMHx of DCIS s/p lumpectomy and RT in 2014, severe COPD, actively smoking, depression, HTN, LBBB, macular degeneration, osteopenia, chronic pain syndrome, lumbar radiculopathy, abdominal hernia, B12 deficiency, LE edema, dyslipidemia who is now admitted s/p mechanical fall and AMS and now found to have acute R inferior pubic rami fracture and T5/T11 and 12 acute on chronic vertebral compression fracture without acute intracranial disease. Orthopedic surgery was consulted, no surgical intervention planned at this time.     Patient last had a mammogram 2021 which was normal. she has a hx of DCIS s/p lumpectomy and RT, records need to be reviewed. Outpatient goals seem to be conservative management given age, as all subsequent screening modalities have been discontinues. CT Chest here now shows a cystic lesion in the right breast measures up to 3.2 cm without obvious LAD otherwise.      ROS: pertinent positives and negatives as per HPI    Allergies: No Known Allergies      PMHx:  HTN (hypertension)  Glaucoma  Cataract  Herniated disc  Osteoarthritis (arthritis due to wear and tear of joints)  Endometriosis  Ovarian cyst  Left ventricular dysfunction  COPD (chronic obstructive pulmonary disease)    SurgHx:   S/P cataract surgery  S/P appendectomy  S/P ovarian cystectomy  Herniated disc  S/P endometrial ablation    FHx:   No pertinent family history in first degree relatives      SocHx:   Current smoker, lives at home alone  She used to be a nurse     Meds:   MEDICATIONS  (STANDING):  atorvastatin 40 milliGRAM(s) Oral at bedtime  budesonide 160 MICROgram(s)/formoterol 4.5 MICROgram(s) Inhaler 2 Puff(s) Inhalation two times a day  diltiazem    milliGRAM(s) Oral daily  DULoxetine 30 milliGRAM(s) Oral daily  enoxaparin Injectable 40 milliGRAM(s) SubCutaneous every 24 hours  gabapentin 600 milliGRAM(s) Oral at bedtime    MEDICATIONS  (PRN):  oxycodone    5 mG/acetaminophen 325 mG 2 Tablet(s) Oral every 4 hours PRN mod to severe pain    Vital Signs  T(C): 36.8 (02-01-24 @ 04:50), Max: 37.2 (01-31-24 @ 22:02)  T(F): 98.3 (02-01-24 @ 04:50), Max: 99 (01-31-24 @ 22:02)  HR: 63 (02-01-24 @ 04:50) (63 - 79)  BP: 154/77 (02-01-24 @ 04:50) (125/59 - 154/77)  RR: 18 (02-01-24 @ 04:50) (17 - 18)  SpO2: 95% (02-01-24 @ 04:50) (94% - 95%)    Physical Exam:  Gen: NAD, thin and frail   HEENT: EOMI, MMM  Chest: Equal chest rise, speaks full sentences   Breast exam: R breast with Inferior right quadrant 6cm firm mass   Cardiac: RR  Abd: Nondistended  Ext: No edema   Neuro: AAOX2, pleasant mood and affect    Labs:                        12.3   6.67  )-----------( 198      ( 30 Jan 2024 17:52 )             37.8     CBC Full  -  ( 30 Jan 2024 17:52 )  WBC Count : 6.67 K/uL  RBC Count : 3.80 M/uL  Hemoglobin : 12.3 g/dL  Hematocrit : 37.8 %  Platelet Count - Automated : 198 K/uL  Mean Cell Volume : 99.5 fl  Mean Cell Hemoglobin : 32.4 pg  Mean Cell Hemoglobin Concentration : 32.5 gm/dL  Auto Neutrophil # : 5.28 K/uL  Auto Lymphocyte # : 0.67 K/uL  Auto Monocyte # : 0.60 K/uL  Auto Eosinophil # : 0.04 K/uL  Auto Basophil # : 0.04 K/uL  Auto Neutrophil % : 79.2 %  Auto Lymphocyte % : 10.0 %  Auto Monocyte % : 9.0 %  Auto Eosinophil % : 0.6 %  Auto Basophil % : 0.6 %    01-30    139  |  100  |  8   ----------------------------<  102<H>  3.5   |  31  |  0.56    Ca    8.7      30 Jan 2024 17:52    TPro  5.7<L>  /  Alb  3.6  /  TBili  0.4  /  DBili  x   /  AST  15  /  ALT  12  /  AlkPhos  65  01-30    PT/INR - ( 30 Jan 2024 17:52 )   PT: 10.8 sec;   INR: 1.03 ratio         PTT - ( 30 Jan 2024 17:52 )  PTT:35.1 sec

## 2024-02-01 NOTE — OCCUPATIONAL THERAPY INITIAL EVALUATION ADULT - LIVES WITH, PROFILE
Per Pt dtr, pt lives alone vasyl PH+ 4 EZIO with 1st floor set-up. Dtr reports pt needed supervision for bathing and was (I) with dressing PTA. Pt used a rollator as needed for mobility in home. Pt owns a s/c ,and shower chair./alone

## 2024-02-01 NOTE — PHYSICAL THERAPY INITIAL EVALUATION ADULT - ADDITIONAL COMMENTS
Patient lives alone in a private house with 4STE w/B/L handrails; as per pt., she was independent in mobility and ADLs w/use of RW; pt.'s daughter present at the bedside reports she assists patient w/ADLs/IADLs occasionally.

## 2024-02-01 NOTE — ADVANCED PRACTICE NURSE CONSULT - RECOMMEDATIONS
Recommendations   1. Bilateral  sacrum  deep tissue injury   Topical therapy- sacral/bilateral buttocks- cleanse w/incontinent cleanser, pat dry & apply meera cream   twice daily & prn soiling . Monitor for changes .  2. bilateral  heels    Elevate heels; apply Complete Cair air fluidized boots; ensure that the soles of the feet are not resting on the foot board of the bed.  3  Incontinent management - incontinent cleanser, pads,  valentín care  BID  4. Maintain on an alternating air with low air loss surface   5. Turn & reposition every 2 hr; Use positioning pillow to turn and reposition, soft pillow between bony prominences; continue measures to decrease friction/shear/pressure.  6. Nutrition optimization.  7.  Place waffle cushion when out of bed to chair .    8.  plan of care reviewed with covering staff RN  Impression  bilateral sacrum deep tissue injury, present on admission.   Lumber spine deep tissue injury with evolution, present on admission.   Recommendations   1. Bilateral  sacrum  deep tissue injury   Topical therapy- sacral/bilateral buttocks- cleanse w/incontinent cleanser, pat dry & apply meera cream   twice daily & prn soiling . Monitor for changes .  2. bilateral  heels    Elevate heels; apply Complete Cair air fluidized boots; ensure that the soles of the feet are not resting on the foot board of the bed.  3  Incontinent management - incontinent cleanser, pads,  valentín care  BID  4. Maintain on an alternating air with low air loss surface   5. Turn & reposition every 2 hr; Use positioning pillow to turn and reposition, soft pillow between bony prominences; continue measures to decrease friction/shear/pressure.  6. Nutrition optimization.  7.  Place waffle cushion when out of bed to chair .    8. Lumber spine deep tissue injury with evolution   plan of care reviewed with covering staff RN  Impression  bilateral sacrum deep tissue injury, present on admission.   Lumber spine deep tissue injury with evolution, present on admission.   Recommendations   1. Bilateral  sacrum  deep tissue injury   Topical therapy- sacral/bilateral buttocks- cleanse w/incontinent cleanser, pat dry & apply meera cream   twice daily & prn soiling . Monitor for changes .  2. bilateral  heels    Elevate heels; apply Complete Cair air fluidized boots; ensure that the soles of the feet are not resting on the foot board of the bed.  3  Incontinent management - incontinent cleanser, pads,  valentín care  BID  4. Maintain on an alternating air with low air loss surface   5. Turn & reposition every 2 hr; Use positioning pillow to turn and reposition, soft pillow between bony prominences; continue measures to decrease friction/shear/pressure.  6. Nutrition optimization.  7.  Place waffle cushion when out of bed to chair .    8. Lumber spine deep tissue injury with evolution cleanse w/NS, pat dry, & apply 3M No Sting Cavilon liquid barrier film wipe to wound cover with Allevyn ( foam) change daily and when soiled  monitor for changes  plan of care reviewed with covering staff RN

## 2024-02-01 NOTE — OCCUPATIONAL THERAPY INITIAL EVALUATION ADULT - PERTINENT HX OF CURRENT PROBLEM, REHAB EVAL
89F c hx COPD, current smoker, pw fall, and back pain.Pt moderately confused at time of interview, A&Ox1, trying to climb out of bed, but answering most questions appropriately.  Pt states 2 days ago, she was in her house, trying to take her jacket off a hook and fell backwards onto the ground and hitting her head on a chair. Pt states she was able to get up afterwards and sit on her bed. Pt states she then called her daughter.  Pt lives alone and the fall was unwitnessed. Pt currently reports back pain. Also states she had nausea, vomiting, and diarrhea at some point, but can't say when. Otherwise denies any other symptoms, other than that she had a very bad night as she was unable to sleep due to being in the hallway.  CT SPINE : Acute-appearing moderate T11 and T12 compression fractures.Age-indeterminate mild T5, T9, moderate L1 and progression of moderate L2 compression fractures. Correlate for point tenderness. CT ABDOMEN/PELVIS/CHEST : Acute fracture of the right inferior pubic ramus.Acute appearing compression fracture deformities of T5, T11, and T12 superimposed on numerous chronic appearing fracture deformities.No acute traumatic sequela in the chest.Cystic lesion in the right breast measures up to 3.2 cm and can be further evaluated with dedicated breast imaging.BRAIN:No acute intracranial hemorrhage, mass effect or midline shift.Chronic right cerebellar infarct.CERVICAL SPINE:No acute fracture or traumatic malalignment.Multilevel cervical spondylosis.MRI LUMBAR1. Limited exam, as above.2. Chronic L1 and L2 compression fractures. No evidence of acute lumbar   compression fracture.3. Grade 1 anterolisthesis L3 over L4 and evidence of moderate dextroscoliosis. Postsurgical at L4-L5, as above.4. Additional findings, including those degenerative, described in detail above. XRAY PELVIS: Evaluation limited by marked bladder distention with residual contrast, correlate for potential urinary retention.Redemonstrated acute slightly offset mid right inferior pubic ramus fracture and old left superior and inferior pubic rami fracture deformities.Redemonstrated intact lower lumbar posterior spinal fusion hardware   consisting of pedicle screws with interconnecting rods.Mild pubic symphysis degenerative change.Generalized marked osteopenia again noted. XRAY CHEST: No focal consolidation. No acute traumatic findings. XRAY FEMUR : Acute right inferior pubic ramus fracture.

## 2024-02-01 NOTE — PHYSICAL THERAPY INITIAL EVALUATION ADULT - GAIT DEVIATIONS NOTED, PT EVAL
decreased aura/decreased velocity of limb motion/decreased step length/decreased weight-shifting ability

## 2024-02-01 NOTE — PHYSICAL THERAPY INITIAL EVALUATION ADULT - PLANNED THERAPY INTERVENTIONS, PT EVAL
GOAL: Patient will be able to negotiate 6 steps in 2 weeks/balance training/bed mobility training/gait training

## 2024-02-01 NOTE — ADVANCED PRACTICE NURSE CONSULT - REASON FOR CONSULT
Consult to assess patient skin initiated by RN FOR SACRUM stage 2 pressure injury, present on admission.   History of Present Illness:  Reason for Admission: fall, back pain  History of Present Illness:   89F c hx COPD, current smoker, pw fall, and back pain.    Pt moderately confused at time of interview, A&Ox1, trying to climb out of bed, but answering most questions appropriately.  Pt states 2 days ago, she was in her house, trying to take her jacket off a hook and fell backwards onto the ground and hitting her head on a chair. Pt states she was able to get up afterwards and sit on her bed. Pt states she then called her daughter.  Pt lives alone and the fall was unwitnessed. Pt currently reports back pain. Also states she had nausea, vomiting, and diarrhea at some point, but can't say when. Otherwise denies any other symptoms, other than that she had a very bad night as she was unable to sleep due to being in the hallway. Consult to assess patient skin initiated by RN for sacrum stage 2 pressure injury, present on admission.   History of Present Illness:  Reason for Admission: fall, back pain  History of Present Illness:   89F c hx COPD, current smoker, pw fall, and back pain.    Pt moderately confused at time of interview, A&Ox1, trying to climb out of bed, but answering most questions appropriately.  Pt states 2 days ago, she was in her house, trying to take her jacket off a hook and fell backwards onto the ground and hitting her head on a chair. Pt states she was able to get up afterwards and sit on her bed. Pt states she then called her daughter.  Pt lives alone and the fall was unwitnessed. Pt currently reports back pain. Also states she had nausea, vomiting, and diarrhea at some point, but can't say when. Otherwise denies any other symptoms, other than that she had a very bad night as she was unable to sleep due to being in the hallway.

## 2024-02-01 NOTE — OCCUPATIONAL THERAPY INITIAL EVALUATION ADULT - BALANCE TRAINING, PT EVAL
GOAL: Pt will demonstrate improved balance 1 grade  while completing grooming task at sink independently in 4 weeks.

## 2024-02-01 NOTE — PHYSICAL THERAPY INITIAL EVALUATION ADULT - PERTINENT HX OF CURRENT PROBLEM, REHAB EVAL
89F c hx COPD, current smoker, pw fall, and back pain. Pt moderately confused at time of interview, A&Ox1, trying to climb out of bed, but answering most questions appropriately. Pt states 2 days ago, she was in her house, trying to take her jacket off a hook and fell backwards onto the ground and hitting her head on a chair. Pt states she was able to get up afterwards and sit on her bed. Pt states she then called her daughter. Pt lives alone and the fall was unwitnessed. Pt currently reports back pain. Also states she had nausea, vomiting, and diarrhea at some point, but can't say when. Otherwise denies any other symptoms, other than that she had a very bad night as she was unable to sleep due to being in the hallway. Hospital course: (1/30) CTH: No acute intracranial hemorrhage, mass effect or midline shift.  Chronic right cerebellar infarct. (1/30) CT C-spine: No acute fracture or traumatic malalignment.  Multilevel cervical spondylosis. (1/30) CT Chest: No acute traumatic sequela in the chest. (1/30) X-ray RT Femur, pelvis: Acute right inferior pubic ramus fracture. (1/30) CT Abd/Pelvis: Scattered colonic diverticula. No bowel obstruction. Small hiatal hernia. (1/31) CT/MR Lumbar/Thoracic: Acute-appearing moderate T11 and T12 compression fractures. Chronic L1-L2 lumbar compression fx.

## 2024-02-01 NOTE — ADVANCED PRACTICE NURSE CONSULT - ASSESSMENT
Arrived on unit, patient was found lying in a low air loss pressure redistribution support surface style bed, x 1 person assistance needed to turn patient. once  turned was able to view her skin.    Lumber spine  there is  non- blanchable deep red  discoloration and hyperpigmentation  with area of partial- thickness and tissue loss noted to measure approximately 1 cm x 2 cm x 0.1 cm.  This  is consistent with deep tissue injury with evolution,  present on admission.  bilateral sacrum there is  non- blanchable deep red  discoloration and hyperpigmentation  measure approximately 5cm x 4 cm x 0.1 cm.  This  is consistent with deep tissue injury ,  present on admission.  plan of care reviewed with covering staff RN  at bedside.

## 2024-02-01 NOTE — PROGRESS NOTE ADULT - ASSESSMENT
Patient is a 89 year old female with PMHx of COPD, Current Smoker, Endometriosis, Glaucoma HLD, HTN and OA. Presents to [facility] sp fall. Found to have multiple new thoracic compression fx.    # Thoracic Compression Fx 2/2 Fall:    # Breast Lesion:  - CT Chest noted with cystic lesion in the right breast measures up to 3.2 cm without obvious LAD otherwise  - s/p lumpectomy and RT in 2014   - Fu Breast US  - Heme following    # COPD:    # HLD/HTN:    # Vitamin B12 Deficiency:  - C/w PO B12    # DVT ppx:  - Lovenox    [facility]  [phone]   Patient is a 89 year old female with PMHx of COPD, Current Smoker, Endometriosis, Glaucoma HLD, HTN and OA. Presents to [facility] sp fall. Found to have multiple new thoracic compression fx.    # Thoracic Compression Fx 2/2 Fall:  - CT A/P with acute fx R inf pubic rami, T5/T11 and 12 acute on chronic VCF w minimal retropulsion  - CT T/L Spine with acute-appearing moderate T11 and T12 compression fractures. Age-indeterminate mild T5, T9, moderate L1 and progression of moderate L2 compression fractures. Correlate for point tenderness.  - WBAT with TLSO brace for comfort  - Pain mgmt  - PT/OT  - Ortho following    # Breast Lesion:  - CT Chest noted with cystic lesion in the right breast measures up to 3.2 cm without obvious LAD otherwise  - s/p lumpectomy and RT in 2014   - Fu Breast US  - Heme following    # COPD:  - C/w inhalers  - Monitor O2    # HLD/HTN:  - C/w CV meds    # Vitamin B12 Deficiency:  - C/w PO B12    # DVT ppx:  - Lovenox    [facility]  [phone]

## 2024-02-01 NOTE — OCCUPATIONAL THERAPY INITIAL EVALUATION ADULT - DIAGNOSIS, OT EVAL
Pt presents with decreased strength, endurance, balance, coordination impacting functional mobility and ADLs.

## 2024-02-01 NOTE — CONSULT NOTE ADULT - ASSESSMENT
Ms. Arguello is an 89y Female with PMHx of DCIS s/p lumpectomy and RT in 2014, severe COPD, actively smoking, depression, HTN, LBBB, macular degeneration, osteopenia, chronic pain syndrome, lumbar radiculopathy, abdominal hernia, B12 deficiency, LE edema, dyslipidemia who is now admitted s/p mechanical fall and AMS and now found to have acute R inferior pubic rami fracture and T5/T11 and 12 acute on chronic vertebral compression fracture without acute intracranial disease. Orthopedic surgery was consulted, no surgical intervention planned at this time.     Patient last had a mammogram 2021 which was normal. she has a hx of DCIS s/p lumpectomy and RT, records need to be reviewed. Outpatient goals seem to be conservative management given age, as all subsequent screening modalities have been discontinues. CT Chest here now shows a cystic lesion in the right breast measures up to 3.2 cm without obvious LAD otherwise.   Ms. Arguello is an 89y Female with PMHx of DCIS s/p lumpectomy and RT in 2014, severe COPD, actively smoking, depression, HTN, LBBB, macular degeneration, osteopenia, chronic pain syndrome, lumbar radiculopathy, abdominal hernia, B12 deficiency, LE edema, dyslipidemia who is now admitted s/p mechanical fall and AMS and now found to have acute R inferior pubic rami fracture and T5/T11 and 12 acute on chronic vertebral compression fracture without acute intracranial disease. Orthopedic surgery was consulted, no surgical intervention planned at this time.     Patient last had a mammogram 2021 which was normal. she has a hx of DCIS s/p lumpectomy and RT, records need to be reviewed. Outpatient goals seem to be conservative management given age, as all subsequent screening modalities have been discontinues. CT Chest here now shows a cystic lesion in the right breast measures up to 3.2 cm without obvious LAD otherwise.  Labs here stable, CBC without anemia, normal renal and liver function, coags wnl.     # R Breast cystic lesion  - 3.2cm breast lesion  - Hx of R breast DCIS vs malignancy? see below   - Can be followed up outpatient and discuss goals of care     # Hx of R breast cancer  - Unclear if DCIS vs invasive disease  - Will obtain outpatient records and review  - Pt s/p lumpectomy and RT in 2014     # Vit B12 deficiency   - Continue PO B12     # Lymphopenia  - Likely reactive vs chronic  - Can continue to monitor     Thank you for allowing me to participate in the care of Ms. Arguello please do not hesitate to call or text me if you have further questions or concerns.     Issa Lyon MD  [facility] [place]   Division of Hematology/Oncology  [address] [place] [address], [address]  [place] [address]  P: [phone]  F: [phone]    Attestation:   Total time spent on the encounter: >** minutes   ----Including ** min face-to-face interaction in addition to chart review, reviewing treatment plan, and managing the patient’s chronic diagnoses as listed in the assessment----     1.	I have reviewed, analyzed and interpreted the following labs: CBC, CMP, imaging:  CT C/A/P CT Head-C-T-L spine and MRI T-L spine impressions as above.   2.	I have reviewed notes stating pts current admission, consultants and follow ups.   3.	I have reviewed the past medical, family, and surgical history and confirmed with outpatient records  Ms. Arguello is an 89y Female with PMHx of DCIS s/p lumpectomy and RT in 2014, severe COPD, actively smoking, depression, HTN, LBBB, macular degeneration, osteopenia, chronic pain syndrome, lumbar radiculopathy, abdominal hernia, B12 deficiency, LE edema, dyslipidemia who is now admitted s/p mechanical fall and AMS and now found to have acute R inferior pubic rami fracture and T5/T11 and 12 acute on chronic vertebral compression fracture without acute intracranial disease. Orthopedic surgery was consulted, no surgical intervention planned at this time.     Patient last had a mammogram 2021 which was normal. she has a hx of DCIS s/p lumpectomy and RT, records need to be reviewed. Outpatient goals seem to be conservative management given age, as all subsequent screening modalities have been discontinues. CT Chest here now shows a cystic lesion in the right breast measures up to 3.2 cm without obvious LAD otherwise.  Labs here stable, CBC without anemia, normal renal and liver function, coags wnl.     # R Breast cystic lesion  - 3.2cm breast lesion on CT Chest  - R breast Inferior right quadrant 6cm firm mass on exam   - Hx of R breast DCIS vs malignancy? see below   - Pt would like to obtain US while inpatient, reasonable  - Can then be followed up outpatient and discuss goals of care     # Hx of R breast cancer  - Unclear if DCIS vs invasive disease  - Will obtain outpatient records and review  - Pt s/p lumpectomy and RT in 2014     # Vit B12 deficiency   - Continue PO B12     # Lymphopenia  - Likely reactive vs chronic  - Can continue to monitor     Thank you for allowing me to participate in the care of Ms. Arguello please do not hesitate to call or text me if you have further questions or concerns.     Issa Lyon MD  [facility] [place]   Division of Hematology/Oncology  [facility], [address]  [place] [address]  P: [phone]  F: [phone]    Attestation:   Total time spent on the encounter: >60 minutes   ----Including >30 min face-to-face interaction in addition to chart review, reviewing treatment plan, and managing the patient’s chronic diagnoses as listed in the assessment----     1.	I have reviewed, analyzed and interpreted the following labs: CBC, CMP, imaging:  CT C/A/P CT Head-C-T-L spine and MRI T-L spine impressions as above.   2.	I have reviewed notes stating pts current admission, consultants and follow ups.   3.	I have reviewed the past medical, family, and surgical history and confirmed with outpatient records

## 2024-02-01 NOTE — PROGRESS NOTE ADULT - SUBJECTIVE AND OBJECTIVE BOX
SUBJECTIVE / OVERNIGHT EVENTS:          --------------------------------------------------------------------------------------------  LABS:                        12.3   6.66  )-----------( 201      ( 01 Feb 2024 07:02 )             36.1     01-30    139  |  100  |  8   ----------------------------<  102<H>  3.5   |  31  |  0.56    Ca    8.7      30 Jan 2024 17:52    TPro  5.7<L>  /  Alb  3.6  /  TBili  0.4  /  DBili  x   /  AST  15  /  ALT  12  /  AlkPhos  65  01-30    PT/INR - ( 30 Jan 2024 17:52 )   PT: 10.8 sec;   INR: 1.03 ratio         PTT - ( 30 Jan 2024 17:52 )  PTT:35.1 sec  CAPILLARY BLOOD GLUCOSE        CARDIAC MARKERS ( 30 Jan 2024 17:52 )  x     / x     / 48 U/L / x     / x          Urinalysis Basic - ( 30 Jan 2024 21:57 )    Color: Yellow / Appearance: Clear / SG: >1.030 / pH: x  Gluc: x / Ketone: Negative mg/dL  / Bili: Negative / Urobili: 1.0 mg/dL   Blood: x / Protein: Negative mg/dL / Nitrite: Negative   Leuk Esterase: Negative / RBC: x / WBC x   Sq Epi: x / Non Sq Epi: x / Bacteria: x        RADIOLOGY & ADDITIONAL TESTS:    Imaging Personally Reviewed:  [x] YES  [ ] NO    Consultant(s) Notes Reviewed:  [x] YES  [ ] NO    MEDICATIONS  (STANDING):  atorvastatin 40 milliGRAM(s) Oral at bedtime  budesonide 160 MICROgram(s)/formoterol 4.5 MICROgram(s) Inhaler 2 Puff(s) Inhalation two times a day  diltiazem    milliGRAM(s) Oral daily  DULoxetine 30 milliGRAM(s) Oral daily  enoxaparin Injectable 40 milliGRAM(s) SubCutaneous every 24 hours  gabapentin 600 milliGRAM(s) Oral at bedtime    MEDICATIONS  (PRN):  oxycodone    5 mG/acetaminophen 325 mG 2 Tablet(s) Oral every 4 hours PRN mod to severe pain      Care Discussed with Consultants/Other Providers [x] YES  [ ] NO    Vital Signs Last 24 Hrs  T(C): 36.8 (01 Feb 2024 04:50), Max: 37.2 (31 Jan 2024 22:02)  T(F): 98.3 (01 Feb 2024 04:50), Max: 99 (31 Jan 2024 22:02)  HR: 63 (01 Feb 2024 04:50) (63 - 79)  BP: 154/77 (01 Feb 2024 04:50) (125/59 - 154/77)  BP(mean): --  RR: 18 (01 Feb 2024 04:50) (17 - 18)  SpO2: 95% (01 Feb 2024 04:50) (94% - 95%)    Parameters below as of 01 Feb 2024 04:50  Patient On (Oxygen Delivery Method): room air      I&O's Summary         SUBJECTIVE / OVERNIGHT EVENTS:    patient seen and examined  resting comfortably in bed  unable to obtain ROS    --------------------------------------------------------------------------------------------  LABS:                        12.3   6.66  )-----------( 201      ( 01 Feb 2024 07:02 )             36.1     01-30    139  |  100  |  8   ----------------------------<  102<H>  3.5   |  31  |  0.56    Ca    8.7      30 Jan 2024 17:52    TPro  5.7<L>  /  Alb  3.6  /  TBili  0.4  /  DBili  x   /  AST  15  /  ALT  12  /  AlkPhos  65  01-30    PT/INR - ( 30 Jan 2024 17:52 )   PT: 10.8 sec;   INR: 1.03 ratio         PTT - ( 30 Jan 2024 17:52 )  PTT:35.1 sec  CAPILLARY BLOOD GLUCOSE        CARDIAC MARKERS ( 30 Jan 2024 17:52 )  x     / x     / 48 U/L / x     / x          Urinalysis Basic - ( 30 Jan 2024 21:57 )    Color: Yellow / Appearance: Clear / SG: >1.030 / pH: x  Gluc: x / Ketone: Negative mg/dL  / Bili: Negative / Urobili: 1.0 mg/dL   Blood: x / Protein: Negative mg/dL / Nitrite: Negative   Leuk Esterase: Negative / RBC: x / WBC x   Sq Epi: x / Non Sq Epi: x / Bacteria: x        RADIOLOGY & ADDITIONAL TESTS:    Imaging Personally Reviewed:  [x] YES  [ ] NO    Consultant(s) Notes Reviewed:  [x] YES  [ ] NO    MEDICATIONS  (STANDING):  atorvastatin 40 milliGRAM(s) Oral at bedtime  budesonide 160 MICROgram(s)/formoterol 4.5 MICROgram(s) Inhaler 2 Puff(s) Inhalation two times a day  diltiazem    milliGRAM(s) Oral daily  DULoxetine 30 milliGRAM(s) Oral daily  enoxaparin Injectable 40 milliGRAM(s) SubCutaneous every 24 hours  gabapentin 600 milliGRAM(s) Oral at bedtime    MEDICATIONS  (PRN):  oxycodone    5 mG/acetaminophen 325 mG 2 Tablet(s) Oral every 4 hours PRN mod to severe pain      Care Discussed with Consultants/Other Providers [x] YES  [ ] NO    Vital Signs Last 24 Hrs  T(C): 36.8 (01 Feb 2024 04:50), Max: 37.2 (31 Jan 2024 22:02)  T(F): 98.3 (01 Feb 2024 04:50), Max: 99 (31 Jan 2024 22:02)  HR: 63 (01 Feb 2024 04:50) (63 - 79)  BP: 154/77 (01 Feb 2024 04:50) (125/59 - 154/77)  BP(mean): --  RR: 18 (01 Feb 2024 04:50) (17 - 18)  SpO2: 95% (01 Feb 2024 04:50) (94% - 95%)    Parameters below as of 01 Feb 2024 04:50  Patient On (Oxygen Delivery Method): room air      I&O's Summary    PHYSICAL EXAM:  GENERAL: NAD, well-developed, comfortable, frail  HEAD:  Atraumatic, Normocephalic  EYES: EOMI, PERRLA, conjunctiva and sclera clear  NECK: Supple, No JVD  CHEST/LUNG: Clear to auscultation bilaterally; No wheeze  HEART: Regular rate and rhythm; No murmurs, rubs, or gallops  ABDOMEN: Soft, Nontender, Nondistended; Bowel sounds present  NEURO: AAOx1, no focal weakness, 5/5 b/l extremity strength, b/l knee no arthritis, no effusion   EXTREMITIES:  2+ Peripheral Pulses, No clubbing, cyanosis, or edema  SKIN: No rashes or lesions

## 2024-02-01 NOTE — PHYSICAL THERAPY INITIAL EVALUATION ADULT - WEIGHT-BEARING RESTRICTIONS: STAND/SIT, REHAB EVAL
Problem: Potential for Falls  Goal: Patient will remain free of falls  Description: INTERVENTIONS:  - Assess patient frequently for physical needs  -  Identify cognitive and physical deficits and behaviors that affect risk of falls    -  [place] fall precautions as indicated by assessment   - Educate patient/family on patient safety including physical limitations  - Instruct patient to call for assistance with activity based on assessment  - Modify environment to reduce risk of injury  - Consider OT/PT consult to assist with strengthening/mobility  Outcome: Progressing     Problem: Prexisting or High Potential for Compromised Skin Integrity  Goal: Skin integrity is maintained or improved  Description: INTERVENTIONS:  - Identify patients at risk for skin breakdown  - Assess and monitor skin integrity  - Assess and monitor nutrition and hydration status  - Monitor labs   - Assess for incontinence   - Turn and reposition patient  - Assist with mobility/ambulation  - Relieve pressure over bony prominences  - Avoid friction and shearing  - Provide appropriate hygiene as needed including keeping skin clean and dry  - Evaluate need for skin moisturizer/barrier cream  - Collaborate with interdisciplinary team   - Patient/family teaching  - Consider wound care consult   Outcome: Progressing weight-bearing as tolerated

## 2024-02-02 LAB
ANION GAP SERPL CALC-SCNC: 11 MMOL/L — SIGNIFICANT CHANGE UP (ref 5–17)
BUN SERPL-MCNC: 14 MG/DL — SIGNIFICANT CHANGE UP (ref 7–23)
CALCIUM SERPL-MCNC: 9 MG/DL — SIGNIFICANT CHANGE UP (ref 8.4–10.5)
CHLORIDE SERPL-SCNC: 104 MMOL/L — SIGNIFICANT CHANGE UP (ref 96–108)
CO2 SERPL-SCNC: 30 MMOL/L — SIGNIFICANT CHANGE UP (ref 22–31)
CREAT SERPL-MCNC: 0.56 MG/DL — SIGNIFICANT CHANGE UP (ref 0.5–1.3)
EGFR: 88 ML/MIN/1.73M2 — SIGNIFICANT CHANGE UP
GLUCOSE SERPL-MCNC: 90 MG/DL — SIGNIFICANT CHANGE UP (ref 70–99)
HCT VFR BLD CALC: 34.9 % — SIGNIFICANT CHANGE UP (ref 34.5–45)
HCT VFR BLD CALC: 36.9 % — SIGNIFICANT CHANGE UP (ref 34.5–45)
HGB BLD-MCNC: 11.3 G/DL — LOW (ref 11.5–15.5)
HGB BLD-MCNC: 11.7 G/DL — SIGNIFICANT CHANGE UP (ref 11.5–15.5)
MCHC RBC-ENTMCNC: 31.7 GM/DL — LOW (ref 32–36)
MCHC RBC-ENTMCNC: 32.1 PG — SIGNIFICANT CHANGE UP (ref 27–34)
MCHC RBC-ENTMCNC: 32.4 GM/DL — SIGNIFICANT CHANGE UP (ref 32–36)
MCHC RBC-ENTMCNC: 32.5 PG — SIGNIFICANT CHANGE UP (ref 27–34)
MCV RBC AUTO: 100.3 FL — HIGH (ref 80–100)
MCV RBC AUTO: 101.1 FL — HIGH (ref 80–100)
MRSA PCR RESULT.: SIGNIFICANT CHANGE UP
NRBC # BLD: 0 /100 WBCS — SIGNIFICANT CHANGE UP (ref 0–0)
NRBC # BLD: 0 /100 WBCS — SIGNIFICANT CHANGE UP (ref 0–0)
PLATELET # BLD AUTO: 204 K/UL — SIGNIFICANT CHANGE UP (ref 150–400)
PLATELET # BLD AUTO: 228 K/UL — SIGNIFICANT CHANGE UP (ref 150–400)
POTASSIUM SERPL-MCNC: 3.2 MMOL/L — LOW (ref 3.5–5.3)
POTASSIUM SERPL-SCNC: 3.2 MMOL/L — LOW (ref 3.5–5.3)
RBC # BLD: 3.48 M/UL — LOW (ref 3.8–5.2)
RBC # BLD: 3.65 M/UL — LOW (ref 3.8–5.2)
RBC # FLD: 12.8 % — SIGNIFICANT CHANGE UP (ref 10.3–14.5)
RBC # FLD: 12.8 % — SIGNIFICANT CHANGE UP (ref 10.3–14.5)
S AUREUS DNA NOSE QL NAA+PROBE: SIGNIFICANT CHANGE UP
SODIUM SERPL-SCNC: 145 MMOL/L — SIGNIFICANT CHANGE UP (ref 135–145)
WBC # BLD: 5.09 K/UL — SIGNIFICANT CHANGE UP (ref 3.8–10.5)
WBC # BLD: 6.46 K/UL — SIGNIFICANT CHANGE UP (ref 3.8–10.5)
WBC # FLD AUTO: 5.09 K/UL — SIGNIFICANT CHANGE UP (ref 3.8–10.5)
WBC # FLD AUTO: 6.46 K/UL — SIGNIFICANT CHANGE UP (ref 3.8–10.5)

## 2024-02-02 RX ORDER — POTASSIUM CHLORIDE 20 MEQ
20 PACKET (EA) ORAL
Refills: 0 | Status: COMPLETED | OUTPATIENT
Start: 2024-02-02 | End: 2024-02-02

## 2024-02-02 RX ADMIN — BUDESONIDE AND FORMOTEROL FUMARATE DIHYDRATE 2 PUFF(S): 160; 4.5 AEROSOL RESPIRATORY (INHALATION) at 05:44

## 2024-02-02 RX ADMIN — BUDESONIDE AND FORMOTEROL FUMARATE DIHYDRATE 2 PUFF(S): 160; 4.5 AEROSOL RESPIRATORY (INHALATION) at 18:04

## 2024-02-02 RX ADMIN — ENOXAPARIN SODIUM 40 MILLIGRAM(S): 100 INJECTION SUBCUTANEOUS at 05:42

## 2024-02-02 RX ADMIN — Medication 20 MILLIEQUIVALENT(S): at 14:35

## 2024-02-02 RX ADMIN — POLYETHYLENE GLYCOL 3350 17 GRAM(S): 17 POWDER, FOR SOLUTION ORAL at 12:53

## 2024-02-02 RX ADMIN — Medication 120 MILLIGRAM(S): at 05:43

## 2024-02-02 RX ADMIN — CHLORHEXIDINE GLUCONATE 1 APPLICATION(S): 213 SOLUTION TOPICAL at 05:42

## 2024-02-02 RX ADMIN — DULOXETINE HYDROCHLORIDE 30 MILLIGRAM(S): 30 CAPSULE, DELAYED RELEASE ORAL at 12:52

## 2024-02-02 RX ADMIN — ATORVASTATIN CALCIUM 40 MILLIGRAM(S): 80 TABLET, FILM COATED ORAL at 21:57

## 2024-02-02 RX ADMIN — GABAPENTIN 600 MILLIGRAM(S): 400 CAPSULE ORAL at 21:57

## 2024-02-02 RX ADMIN — SENNA PLUS 2 TABLET(S): 8.6 TABLET ORAL at 21:57

## 2024-02-02 RX ADMIN — Medication 20 MILLIEQUIVALENT(S): at 12:52

## 2024-02-02 NOTE — PROGRESS NOTE ADULT - ASSESSMENT
Patient is a 89 year old female with PMHx of COPD, Current Smoker, Endometriosis, Glaucoma HLD, HTN and OA. Presents to [facility] sp fall. Found to have multiple new thoracic compression fx.    Plan:    # Thoracic Compression Fx 2/2 Fall:  - CT A/P with acute fx R inf pubic rami, T5/T11 and 12 acute on chronic VCF w minimal retropulsion  - CT T/L Spine with acute-appearing moderate T11 and T12 compression fractures. Age-indeterminate mild T5, T9, moderate L1 and progression of moderate L2 compression fractures. Correlate for point tenderness.  - WBAT with TLSO brace for comfort  - Pain mgmt  - PT/OT  - Ortho following    # Breast Lesion:  - 3.2cm breast lesion on CT Chest  - s/p lumpectomy and RT in 2014   - Heme following    # COPD:  - C/w inhalers  - Monitor O2    # HLD/HTN:  - C/w CV meds    # Vitamin B12 Deficiency:  - C/w PO B12    # DVT ppx:  - Lovenox    [facility]  [phone]

## 2024-02-02 NOTE — PROGRESS NOTE ADULT - SUBJECTIVE AND OBJECTIVE BOX
[facility] HEMATOLOGY/ONCOLOGY INPATIENT PROGRESS NOTE     Interval Hx:   02-02-24: Ms. Arguello was seen at bedside today.    Meds:   MEDICATIONS  (STANDING):  atorvastatin 40 milliGRAM(s) Oral at bedtime  budesonide 160 MICROgram(s)/formoterol 4.5 MICROgram(s) Inhaler 2 Puff(s) Inhalation two times a day  chlorhexidine 2% Cloths 1 Application(s) Topical <User Schedule>  diltiazem    milliGRAM(s) Oral daily  DULoxetine 30 milliGRAM(s) Oral daily  enoxaparin Injectable 40 milliGRAM(s) SubCutaneous every 24 hours  gabapentin 600 milliGRAM(s) Oral at bedtime  polyethylene glycol 3350 17 Gram(s) Oral daily  senna 2 Tablet(s) Oral at bedtime    MEDICATIONS  (PRN):  oxycodone    5 mG/acetaminophen 325 mG 2 Tablet(s) Oral every 4 hours PRN mod to severe pain    Vital Signs Last 24 Hrs  T(C): 36.6 (01 Feb 2024 20:21), Max: 36.6 (01 Feb 2024 20:21)  T(F): 97.9 (01 Feb 2024 20:21), Max: 97.9 (01 Feb 2024 20:21)  HR: 89 (01 Feb 2024 20:21) (59 - 89)  BP: 108/64 (01 Feb 2024 20:21) (108/64 - 147/62)  BP(mean): --  RR: 18 (01 Feb 2024 20:21) (16 - 18)  SpO2: 94% (01 Feb 2024 20:21) (92% - 95%)    Parameters below as of 01 Feb 2024 20:21  Patient On (Oxygen Delivery Method): room air    Physical Exam:  Gen: NAD, thin and frail   HEENT: EOMI, MMM  Chest: Equal chest rise, speaks full sentences   Breast exam: R breast with Inferior right quadrant 6cm firm mass   Cardiac: RR  Abd: Nondistended  Ext: No edema   Neuro: AAOX2, pleasant mood and affect    Labs:                        11.3   6.46  )-----------( 204      ( 02 Feb 2024 00:24 )             34.9     CBC Full  -  ( 02 Feb 2024 00:24 )  WBC Count : 6.46 K/uL  RBC Count : 3.48 M/uL  Hemoglobin : 11.3 g/dL  Hematocrit : 34.9 %  Platelet Count - Automated : 204 K/uL  Mean Cell Volume : 100.3 fl  Mean Cell Hemoglobin : 32.5 pg  Mean Cell Hemoglobin Concentration : 32.4 gm/dL  Auto Neutrophil # : x  Auto Lymphocyte # : x  Auto Monocyte # : x  Auto Eosinophil # : x  Auto Basophil # : x  Auto Neutrophil % : x  Auto Lymphocyte % : x  Auto Monocyte % : x  Auto Eosinophil % : x  Auto Basophil % : x             [facility] HEMATOLOGY/ONCOLOGY INPATIENT PROGRESS NOTE     Interval Hx:   02-02-24: Ms. Arguello was seen at bedside today, no acute events overnight, breast US not yet completed    Meds:   MEDICATIONS  (STANDING):  atorvastatin 40 milliGRAM(s) Oral at bedtime  budesonide 160 MICROgram(s)/formoterol 4.5 MICROgram(s) Inhaler 2 Puff(s) Inhalation two times a day  chlorhexidine 2% Cloths 1 Application(s) Topical <User Schedule>  diltiazem    milliGRAM(s) Oral daily  DULoxetine 30 milliGRAM(s) Oral daily  enoxaparin Injectable 40 milliGRAM(s) SubCutaneous every 24 hours  gabapentin 600 milliGRAM(s) Oral at bedtime  polyethylene glycol 3350 17 Gram(s) Oral daily  senna 2 Tablet(s) Oral at bedtime    MEDICATIONS  (PRN):  oxycodone    5 mG/acetaminophen 325 mG 2 Tablet(s) Oral every 4 hours PRN mod to severe pain    Vital Signs Last 24 Hrs  T(C): 36.6 (01 Feb 2024 20:21), Max: 36.6 (01 Feb 2024 20:21)  T(F): 97.9 (01 Feb 2024 20:21), Max: 97.9 (01 Feb 2024 20:21)  HR: 89 (01 Feb 2024 20:21) (59 - 89)  BP: 108/64 (01 Feb 2024 20:21) (108/64 - 147/62)  BP(mean): --  RR: 18 (01 Feb 2024 20:21) (16 - 18)  SpO2: 94% (01 Feb 2024 20:21) (92% - 95%)    Parameters below as of 01 Feb 2024 20:21  Patient On (Oxygen Delivery Method): room air    Physical Exam:  Gen: NAD, thin and frail   HEENT: EOMI, MMM  Chest: Equal chest rise, speaks full sentences   Breast exam: R breast with Inferior right quadrant 6cm firm mass   Cardiac: RR  Abd: Nondistended  Ext: No edema   Neuro: AAOX2, pleasant mood and affect    Labs:                        11.3   6.46  )-----------( 204      ( 02 Feb 2024 00:24 )             34.9     CBC Full  -  ( 02 Feb 2024 00:24 )  WBC Count : 6.46 K/uL  RBC Count : 3.48 M/uL  Hemoglobin : 11.3 g/dL  Hematocrit : 34.9 %  Platelet Count - Automated : 204 K/uL  Mean Cell Volume : 100.3 fl  Mean Cell Hemoglobin : 32.5 pg  Mean Cell Hemoglobin Concentration : 32.4 gm/dL

## 2024-02-02 NOTE — PROGRESS NOTE ADULT - SUBJECTIVE AND OBJECTIVE BOX
SUBJECTIVE / OVERNIGHT EVENTS:      Patient seen and examined at bedside. No events noted overnight. Resting comfortably in bed    --------------------------------------------------------------------------------------------  LABS:                        11.7   5.09  )-----------( 228      ( 02 Feb 2024 07:02 )             36.9     02-02    145  |  104  |  14  ----------------------------<  90  3.2<L>   |  30  |  0.56    Ca    9.0      02 Feb 2024 07:02        CAPILLARY BLOOD GLUCOSE            Urinalysis Basic - ( 02 Feb 2024 07:02 )    Color: x / Appearance: x / SG: x / pH: x  Gluc: 90 mg/dL / Ketone: x  / Bili: x / Urobili: x   Blood: x / Protein: x / Nitrite: x   Leuk Esterase: x / RBC: x / WBC x   Sq Epi: x / Non Sq Epi: x / Bacteria: x        RADIOLOGY & ADDITIONAL TESTS:     Imaging Personally Reviewed:  [x] YES  [ ] NO    Consultant(s) Notes Reviewed:  [x] YES  [ ] NO    MEDICATIONS  (STANDING):  atorvastatin 40 milliGRAM(s) Oral at bedtime  budesonide 160 MICROgram(s)/formoterol 4.5 MICROgram(s) Inhaler 2 Puff(s) Inhalation two times a day  chlorhexidine 2% Cloths 1 Application(s) Topical <User Schedule>  diltiazem    milliGRAM(s) Oral daily  DULoxetine 30 milliGRAM(s) Oral daily  enoxaparin Injectable 40 milliGRAM(s) SubCutaneous every 24 hours  gabapentin 600 milliGRAM(s) Oral at bedtime  polyethylene glycol 3350 17 Gram(s) Oral daily  senna 2 Tablet(s) Oral at bedtime    MEDICATIONS  (PRN):  oxycodone    5 mG/acetaminophen 325 mG 2 Tablet(s) Oral every 4 hours PRN mod to severe pain      Care Discussed with Consultants/Other Providers [x] YES  [ ] NO    Vital Signs Last 24 Hrs  T(C): 36.7 (02 Feb 2024 05:19), Max: 36.7 (02 Feb 2024 05:19)  T(F): 98 (02 Feb 2024 05:19), Max: 98 (02 Feb 2024 05:19)  HR: 74 (02 Feb 2024 05:19) (59 - 89)  BP: 134/74 (02 Feb 2024 05:19) (108/64 - 147/62)  BP(mean): --  RR: 18 (02 Feb 2024 05:19) (16 - 18)  SpO2: 94% (02 Feb 2024 05:19) (92% - 95%)    Parameters below as of 01 Feb 2024 20:21  Patient On (Oxygen Delivery Method): room air      I&O's Summary    PHYSICAL EXAM:  GENERAL: NAD, well-developed, comfortable, frail  HEAD:  Atraumatic, Normocephalic  EYES: EOMI, PERRLA, conjunctiva and sclera clear  NECK: Supple, No JVD  CHEST/LUNG: Clear to auscultation bilaterally; No wheeze  HEART: Regular rate and rhythm; No murmurs, rubs, or gallops  ABDOMEN: Soft, Nontender, Nondistended; Bowel sounds present  NEURO: AAOx1, no focal weakness, 5/5 b/l extremity strength, b/l knee no arthritis, no effusion   EXTREMITIES:  2+ Peripheral Pulses, No clubbing, cyanosis, or edema  SKIN: No rashes or lesions

## 2024-02-02 NOTE — PROGRESS NOTE ADULT - ASSESSMENT
Ms. Arguello is an 89y Female with PMHx of DCIS s/p lumpectomy and RT in 2014, severe COPD, actively smoking, depression, HTN, LBBB, macular degeneration, osteopenia, chronic pain syndrome, lumbar radiculopathy, abdominal hernia, B12 deficiency, LE edema, dyslipidemia who is now admitted s/p mechanical fall and AMS and now found to have acute R inferior pubic rami fracture and T5/T11 and 12 acute on chronic vertebral compression fracture without acute intracranial disease. Orthopedic surgery was consulted, no surgical intervention planned at this time.     Patient last had a mammogram 2021 which was normal. she has a hx of DCIS s/p lumpectomy and RT, records need to be reviewed. Outpatient goals seem to be conservative management given age, as all subsequent screening modalities have been discontinues. CT Chest here now shows a cystic lesion in the right breast measures up to 3.2 cm without obvious LAD otherwise.  Labs here stable, CBC without anemia, normal renal and liver function, coags wnl.     # R Breast cystic lesion  - 3.2cm breast lesion on CT Chest  - R breast Inferior right quadrant 6cm firm mass on exam   - Hx of R breast DCIS vs malignancy? see below   - Pt would like to obtain US while inpatient, reasonable  - Can then be followed up outpatient and discuss goals of care     # Hx of R breast cancer  - Unclear if DCIS vs invasive disease  - Will obtain outpatient records and review  - Pt s/p lumpectomy and RT in 2014     # Vit B12 deficiency   - Continue PO B12     # Lymphopenia  - Likely reactive vs chronic  - Can continue to monitor     Thank you for allowing me to participate in the care of Ms. Arguello please do not hesitate to call or text me if you have further questions or concerns.     Issa Lyon MD  [facility]   Division of Hematology/Oncology  [facility], [address]  [place] [address]  P: [phone]  F: [phone]    Attestation:   Total time spent on the encounter: >60 minutes   ----Including >30 min face-to-face interaction in addition to chart review, reviewing treatment plan, and managing the patient’s chronic diagnoses as listed in the assessment----     1.	I have reviewed, analyzed and interpreted the following labs: CBC, CMP, imaging:  CT C/A/P CT Head-C-T-L spine and MRI T-L spine impressions as above.   2.	I have reviewed notes stating pts current admission, consultants and follow ups.   3.	I have reviewed the past medical, family, and surgical history and confirmed with outpatient records  Ms. Arguello is an 89y Female with PMHx of DCIS s/p lumpectomy and RT in 2014, severe COPD, actively smoking, depression, HTN, LBBB, macular degeneration, osteopenia, chronic pain syndrome, lumbar radiculopathy, abdominal hernia, B12 deficiency, LE edema, dyslipidemia who is now admitted s/p mechanical fall and AMS and now found to have acute R inferior pubic rami fracture and T5/T11 and 12 acute on chronic vertebral compression fracture without acute intracranial disease. Orthopedic surgery was consulted, no surgical intervention planned at this time.     Patient last had a mammogram 2021 which was normal. she has a hx of DCIS s/p lumpectomy and RT, records need to be reviewed. Outpatient goals seem to be conservative management given age, as all subsequent screening modalities have been discontinues. CT Chest here now shows a cystic lesion in the right breast measures up to 3.2 cm without obvious LAD otherwise.  Labs here stable, CBC without anemia, normal renal and liver function, coags wnl.     # R Breast cystic lesion  - 3.2cm breast lesion on CT Chest  - R breast Inferior right quadrant 6cm firm mass on exam   - Hx of R breast DCIS vs malignancy? see below   - Pt would like to obtain US while inpatient, reasonable  - Can then be followed up outpatient and discuss goals of care     # Hx of R breast cancer  - Unclear if DCIS vs invasive disease  - Will obtain outpatient records and review  - Pt s/p lumpectomy and RT in 2014     # Vit B12 deficiency   - Continue PO B12     # Lymphopenia  - Likely reactive vs chronic  - Can continue to monitor     Thank you for allowing me to participate in the care of Ms. Arguello please do not hesitate to call or text me if you have further questions or concerns.     Issa Lyon MD  [facility]   Division of Hematology/Oncology  [address], [address]  [place] [address]  P: [phone]  F: [phone]    Attestation:    ----Pt evaluated including face-to-face interaction in addition to chart review, reviewing treatment plan, and managing the patient’s chronic diagnoses as listed in the assessment----

## 2024-02-03 LAB
ANION GAP SERPL CALC-SCNC: 9 MMOL/L — SIGNIFICANT CHANGE UP (ref 5–17)
BUN SERPL-MCNC: 13 MG/DL — SIGNIFICANT CHANGE UP (ref 7–23)
CALCIUM SERPL-MCNC: 9 MG/DL — SIGNIFICANT CHANGE UP (ref 8.4–10.5)
CHLORIDE SERPL-SCNC: 103 MMOL/L — SIGNIFICANT CHANGE UP (ref 96–108)
CO2 SERPL-SCNC: 31 MMOL/L — SIGNIFICANT CHANGE UP (ref 22–31)
CREAT SERPL-MCNC: 0.6 MG/DL — SIGNIFICANT CHANGE UP (ref 0.5–1.3)
EGFR: 86 ML/MIN/1.73M2 — SIGNIFICANT CHANGE UP
GLUCOSE SERPL-MCNC: 81 MG/DL — SIGNIFICANT CHANGE UP (ref 70–99)
POTASSIUM SERPL-MCNC: 4.1 MMOL/L — SIGNIFICANT CHANGE UP (ref 3.5–5.3)
POTASSIUM SERPL-SCNC: 4.1 MMOL/L — SIGNIFICANT CHANGE UP (ref 3.5–5.3)
SODIUM SERPL-SCNC: 143 MMOL/L — SIGNIFICANT CHANGE UP (ref 135–145)

## 2024-02-03 RX ADMIN — Medication 120 MILLIGRAM(S): at 05:23

## 2024-02-03 RX ADMIN — CHLORHEXIDINE GLUCONATE 1 APPLICATION(S): 213 SOLUTION TOPICAL at 05:23

## 2024-02-03 RX ADMIN — BUDESONIDE AND FORMOTEROL FUMARATE DIHYDRATE 2 PUFF(S): 160; 4.5 AEROSOL RESPIRATORY (INHALATION) at 05:23

## 2024-02-03 RX ADMIN — POLYETHYLENE GLYCOL 3350 17 GRAM(S): 17 POWDER, FOR SOLUTION ORAL at 13:21

## 2024-02-03 RX ADMIN — ATORVASTATIN CALCIUM 40 MILLIGRAM(S): 80 TABLET, FILM COATED ORAL at 21:57

## 2024-02-03 RX ADMIN — ENOXAPARIN SODIUM 40 MILLIGRAM(S): 100 INJECTION SUBCUTANEOUS at 05:23

## 2024-02-03 RX ADMIN — DULOXETINE HYDROCHLORIDE 30 MILLIGRAM(S): 30 CAPSULE, DELAYED RELEASE ORAL at 13:21

## 2024-02-03 RX ADMIN — GABAPENTIN 600 MILLIGRAM(S): 400 CAPSULE ORAL at 21:57

## 2024-02-03 RX ADMIN — BUDESONIDE AND FORMOTEROL FUMARATE DIHYDRATE 2 PUFF(S): 160; 4.5 AEROSOL RESPIRATORY (INHALATION) at 20:08

## 2024-02-03 RX ADMIN — SENNA PLUS 2 TABLET(S): 8.6 TABLET ORAL at 21:57

## 2024-02-03 NOTE — PROGRESS NOTE ADULT - SUBJECTIVE AND OBJECTIVE BOX
[facility] HEMATOLOGY/ONCOLOGY INPATIENT PROGRESS NOTE     Interval Hx:   02-03-24: Ms. Arguello was seen at bedside today.    Meds:   MEDICATIONS  (STANDING):  atorvastatin 40 milliGRAM(s) Oral at bedtime  budesonide 160 MICROgram(s)/formoterol 4.5 MICROgram(s) Inhaler 2 Puff(s) Inhalation two times a day  chlorhexidine 2% Cloths 1 Application(s) Topical <User Schedule>  diltiazem    milliGRAM(s) Oral daily  DULoxetine 30 milliGRAM(s) Oral daily  enoxaparin Injectable 40 milliGRAM(s) SubCutaneous every 24 hours  gabapentin 600 milliGRAM(s) Oral at bedtime  polyethylene glycol 3350 17 Gram(s) Oral daily  senna 2 Tablet(s) Oral at bedtime    MEDICATIONS  (PRN):  oxycodone    5 mG/acetaminophen 325 mG 2 Tablet(s) Oral every 4 hours PRN mod to severe pain    Vital Signs Last 24 Hrs  T(C): 36.4 (03 Feb 2024 04:20), Max: 37 (02 Feb 2024 11:53)  T(F): 97.6 (03 Feb 2024 04:20), Max: 98.6 (02 Feb 2024 11:53)  HR: 86 (03 Feb 2024 04:20) (81 - 100)  BP: 147/82 (03 Feb 2024 04:20) (108/69 - 147/82)  BP(mean): --  RR: 18 (03 Feb 2024 04:20) (18 - 18)  SpO2: 94% (03 Feb 2024 04:20) (94% - 94%)    Parameters below as of 03 Feb 2024 04:20  Patient On (Oxygen Delivery Method): room air    Physical Exam:  Gen: NAD, thin and frail   HEENT: EOMI, MMM  Chest: Equal chest rise, speaks full sentences   Breast exam: R breast with Inferior right quadrant 6cm firm mass   Cardiac: RR  Abd: Nondistended  Ext: No edema   Neuro: AAOX2, pleasant mood and affect    Labs:                        11.7   5.09  )-----------( 228      ( 02 Feb 2024 07:02 )             36.9     CBC Full  -  ( 02 Feb 2024 07:02 )  WBC Count : 5.09 K/uL  RBC Count : 3.65 M/uL  Hemoglobin : 11.7 g/dL  Hematocrit : 36.9 %  Platelet Count - Automated : 228 K/uL  Mean Cell Volume : 101.1 fl  Mean Cell Hemoglobin : 32.1 pg  Mean Cell Hemoglobin Concentration : 31.7 gm/dL  Auto Neutrophil # : x  Auto Lymphocyte # : x  Auto Monocyte # : x  Auto Eosinophil # : x  Auto Basophil # : x  Auto Neutrophil % : x  Auto Lymphocyte % : x  Auto Monocyte % : x  Auto Eosinophil % : x  Auto Basophil % : x    02-02    145  |  104  |  14  ----------------------------<  90  3.2<L>   |  30  |  0.56    Ca    9.0      02 Feb 2024 07:02         [facility]EMAT[facility]/ON[facility]Y INPATIENT PROGRESS NOTE     Interval Hx:   02-03-24: Ms. Arguello was seen at bedside today, nursing staff stated patient did well overnight, US Breast discontinued due to GOC     Meds:   MEDICATIONS  (STANDING):  atorvastatin 40 milliGRAM(s) Oral at bedtime  budesonide 160 MICROgram(s)/formoterol 4.5 MICROgram(s) Inhaler 2 Puff(s) Inhalation two times a day  chlorhexidine 2% Cloths 1 Application(s) Topical <User Schedule>  diltiazem    milliGRAM(s) Oral daily  DULoxetine 30 milliGRAM(s) Oral daily  enoxaparin Injectable 40 milliGRAM(s) SubCutaneous every 24 hours  gabapentin 600 milliGRAM(s) Oral at bedtime  polyethylene glycol 3350 17 Gram(s) Oral daily  senna 2 Tablet(s) Oral at bedtime    MEDICATIONS  (PRN):  oxycodone    5 mG/acetaminophen 325 mG 2 Tablet(s) Oral every 4 hours PRN mod to severe pain    Vital Signs Last 24 Hrs  T(C): 36.4 (03 Feb 2024 04:20), Max: 37 (02 Feb 2024 11:53)  T(F): 97.6 (03 Feb 2024 04:20), Max: 98.6 (02 Feb 2024 11:53)  HR: 86 (03 Feb 2024 04:20) (81 - 100)  BP: 147/82 (03 Feb 2024 04:20) (108/69 - 147/82)  BP(mean): --  RR: 18 (03 Feb 2024 04:20) (18 - 18)  SpO2: 94% (03 Feb 2024 04:20) (94% - 94%)    Parameters below as of 03 Feb 2024 04:20  Patient On (Oxygen Delivery Method): room air    Physical Exam:  Gen: NAD, thin and frail   HEENT: EOMI, MMM  Chest: Equal chest rise, speaks full sentences   Breast exam: R breast with Inferior right quadrant 6cm firm mass   Cardiac: RR  Abd: Nondistended  Ext: No edema   Neuro: AAOX2, pleasant mood and affect    Labs:                        11.7   5.09  )-----------( 228      ( 02 Feb 2024 07:02 )             36.9     CBC Full  -  ( 02 Feb 2024 07:02 )  WBC Count : 5.09 K/uL  RBC Count : 3.65 M/uL  Hemoglobin : 11.7 g/dL  Hematocrit : 36.9 %  Platelet Count - Automated : 228 K/uL  Mean Cell Volume : 101.1 fl  Mean Cell Hemoglobin : 32.1 pg  Mean Cell Hemoglobin Concentration : 31.7 gm/dL    02-02    145  |  104  |  14  ----------------------------<  90  3.2<L>   |  30  |  0.56    Ca    9.0      02 Feb 2024 07:02

## 2024-02-03 NOTE — PROGRESS NOTE ADULT - ASSESSMENT
Patient is a 89 year old female with PMHx of COPD, Current Smoker, Endometriosis, Glaucoma HLD, HTN and OA. Presents to [facility] sp fall. Found to have multiple new thoracic compression fx.    Plan:    # Thoracic Compression Fx 2/2 Fall:  - CT A/P with acute fx R inf pubic rami, T5/T11 and 12 acute on chronic VCF w minimal retropulsion  - CT T/L Spine with acute-appearing moderate T11 and T12 compression fractures. Age-indeterminate mild T5, T9, moderate L1 and progression of moderate L2 compression fractures. Correlate for point tenderness.  - WBAT with TLSO brace for comfort  - Pain mgmt  - PT/OT  - Ortho following    # Breast Lesion:  - 3.2cm breast lesion on CT Chest  - s/p lumpectomy and RT in 2014   - Heme following    # COPD:  - C/w inhalers  - Monitor O2    # HLD/HTN:  - C/w CV meds    # Vitamin B12 Deficiency:  - C/w PO B12    # DVT ppx:  - Lovenox    DC planning    [facility]  [phone]

## 2024-02-03 NOTE — PROGRESS NOTE ADULT - SUBJECTIVE AND OBJECTIVE BOX
SUBJECTIVE / OVERNIGHT EVENTS:      Patient seen and examined at bedside. No events noted overnight. Resting comfortably in bed      --------------------------------------------------------------------------------------------  LABS:                        11.7   5.09  )-----------( 228      ( 02 Feb 2024 07:02 )             36.9     02-02    145  |  104  |  14  ----------------------------<  90  3.2<L>   |  30  |  0.56    Ca    9.0      02 Feb 2024 07:02        CAPILLARY BLOOD GLUCOSE            Urinalysis Basic - ( 02 Feb 2024 07:02 )    Color: x / Appearance: x / SG: x / pH: x  Gluc: 90 mg/dL / Ketone: x  / Bili: x / Urobili: x   Blood: x / Protein: x / Nitrite: x   Leuk Esterase: x / RBC: x / WBC x   Sq Epi: x / Non Sq Epi: x / Bacteria: x        RADIOLOGY & ADDITIONAL TESTS:     Imaging Personally Reviewed:  [x] YES  [ ] NO    Consultant(s) Notes Reviewed:  [x] YES  [ ] NO    MEDICATIONS  (STANDING):  atorvastatin 40 milliGRAM(s) Oral at bedtime  budesonide 160 MICROgram(s)/formoterol 4.5 MICROgram(s) Inhaler 2 Puff(s) Inhalation two times a day  chlorhexidine 2% Cloths 1 Application(s) Topical <User Schedule>  diltiazem    milliGRAM(s) Oral daily  DULoxetine 30 milliGRAM(s) Oral daily  enoxaparin Injectable 40 milliGRAM(s) SubCutaneous every 24 hours  gabapentin 600 milliGRAM(s) Oral at bedtime  polyethylene glycol 3350 17 Gram(s) Oral daily  senna 2 Tablet(s) Oral at bedtime    MEDICATIONS  (PRN):  oxycodone    5 mG/acetaminophen 325 mG 2 Tablet(s) Oral every 4 hours PRN mod to severe pain      Care Discussed with Consultants/Other Providers [x] YES  [ ] NO    Vital Signs Last 24 Hrs  T(C): 36.4 (03 Feb 2024 04:20), Max: 37 (02 Feb 2024 11:53)  T(F): 97.6 (03 Feb 2024 04:20), Max: 98.6 (02 Feb 2024 11:53)  HR: 86 (03 Feb 2024 04:20) (81 - 100)  BP: 147/82 (03 Feb 2024 04:20) (108/69 - 147/82)  BP(mean): --  RR: 18 (03 Feb 2024 04:20) (18 - 18)  SpO2: 94% (03 Feb 2024 04:20) (94% - 94%)    Parameters below as of 03 Feb 2024 04:20  Patient On (Oxygen Delivery Method): room air      I&O's Summary        PHYSICAL EXAM:  GENERAL: NAD, well-developed, comfortable, frail  HEAD:  Atraumatic, Normocephalic  EYES: EOMI, PERRLA, conjunctiva and sclera clear  NECK: Supple, No JVD  CHEST/LUNG: Clear to auscultation bilaterally; No wheeze  HEART: Regular rate and rhythm; No murmurs, rubs, or gallops  ABDOMEN: Soft, Nontender, Nondistended; Bowel sounds present  NEURO: AAOx1, no focal weakness, 5/5 b/l extremity strength, b/l knee no arthritis, no effusion   EXTREMITIES:  2+ Peripheral Pulses, No clubbing, cyanosis, or edema  SKIN: No rashes or lesions

## 2024-02-03 NOTE — PROGRESS NOTE ADULT - ASSESSMENT
Ms. Arguello is an 89y Female with PMHx of DCIS s/p lumpectomy and RT in 2014, severe COPD, actively smoking, depression, HTN, LBBB, macular degeneration, osteopenia, chronic pain syndrome, lumbar radiculopathy, abdominal hernia, B12 deficiency, LE edema, dyslipidemia who is now admitted s/p mechanical fall and AMS and now found to have acute R inferior pubic rami fracture and T5/T11 and 12 acute on chronic vertebral compression fracture without acute intracranial disease. Orthopedic surgery was consulted, no surgical intervention planned at this time.     Patient last had a mammogram 2021 which was normal. she has a hx of DCIS s/p lumpectomy and RT, records need to be reviewed. Outpatient goals seem to be conservative management given age, as all subsequent screening modalities have been discontinues. CT Chest here now shows a cystic lesion in the right breast measures up to 3.2 cm without obvious LAD otherwise.  Labs here stable, CBC without anemia, normal renal and liver function, coags wnl.     # R Breast cystic lesion  - 3.2cm breast lesion on CT Chest  - R breast Inferior right quadrant 6cm firm mass on exam   - Hx of R breast DCIS vs malignancy? see below   - Pt would like to obtain US while inpatient, reasonable  - Can then be followed up outpatient and discuss goals of care     # Hx of R breast cancer  - Unclear if DCIS vs invasive disease  - Will obtain outpatient records and review  - Pt s/p lumpectomy and RT in 2014     # Vit B12 deficiency   - Continue PO B12     # Lymphopenia  - Likely reactive vs chronic  - Can continue to monitor     Thank you for allowing me to participate in the care of Ms. Arguello please do not hesitate to call or text me if you have further questions or concerns.     Issa Lyon MD  [facility]   Division of Hematology/Oncology  [address], [address]  [place] [address]  P: [phone]  F: [phone]    Attestation:    ----Pt evaluated including face-to-face interaction in addition to chart review, reviewing treatment plan, and managing the patient’s chronic diagnoses as listed in the assessment---- Ms. Arguello is an 89y Female with PMHx of DCIS s/p lumpectomy and RT in 2014, severe COPD, actively smoking, depression, HTN, LBBB, macular degeneration, osteopenia, chronic pain syndrome, lumbar radiculopathy, abdominal hernia, B12 deficiency, LE edema, dyslipidemia who is now admitted s/p mechanical fall and AMS and now found to have acute R inferior pubic rami fracture and T5/T11 and 12 acute on chronic vertebral compression fracture without acute intracranial disease. Orthopedic surgery was consulted, no surgical intervention planned at this time.     Patient last had a mammogram 2021 which was normal. she has a hx of DCIS s/p lumpectomy and RT, records need to be reviewed. Outpatient goals seem to be conservative management given age, as all subsequent screening modalities have been discontinues. CT Chest here now shows a cystic lesion in the right breast measures up to 3.2 cm without obvious LAD otherwise.  Labs here stable, CBC without anemia, normal renal and liver function, coags wnl. US Breast canceled given goals of care     # R Breast cystic lesion  - 3.2cm breast lesion on CT Chest  - R breast Inferior right quadrant 6cm firm mass on exam   - Hx of R breast DCIS vs malignancy? see below   - Can work up per pt and family preference   - Can then be followed up outpatient if needed and discuss goals of care at any time     # Hx of R breast cancer  - Unclear if DCIS vs invasive disease  - Will obtain outpatient records and review  - Pt s/p lumpectomy and RT in 2014     # Vit B12 deficiency   - Continue PO B12     # Lymphopenia  - Likely reactive vs chronic  - Can continue to monitor     Thank you for allowing me to participate in the care of Ms. Arguello please do not hesitate to call or text me if you have further questions or concerns.     Issa Lyon MD  [facility]   Division of Hematology/Oncology  [address], [address]  [place] [address]  P: [phone]  F: [phone]    Attestation:    ----Pt evaluated including face-to-face interaction in addition to chart review, reviewing treatment plan, and managing the patient’s chronic diagnoses as listed in the assessment----

## 2024-02-04 LAB — GLUCOSE BLDC GLUCOMTR-MCNC: 124 MG/DL — HIGH (ref 70–99)

## 2024-02-04 RX ADMIN — DULOXETINE HYDROCHLORIDE 30 MILLIGRAM(S): 30 CAPSULE, DELAYED RELEASE ORAL at 13:18

## 2024-02-04 RX ADMIN — GABAPENTIN 600 MILLIGRAM(S): 400 CAPSULE ORAL at 22:30

## 2024-02-04 RX ADMIN — Medication 120 MILLIGRAM(S): at 05:34

## 2024-02-04 RX ADMIN — ENOXAPARIN SODIUM 40 MILLIGRAM(S): 100 INJECTION SUBCUTANEOUS at 05:36

## 2024-02-04 RX ADMIN — BUDESONIDE AND FORMOTEROL FUMARATE DIHYDRATE 2 PUFF(S): 160; 4.5 AEROSOL RESPIRATORY (INHALATION) at 18:43

## 2024-02-04 RX ADMIN — ATORVASTATIN CALCIUM 40 MILLIGRAM(S): 80 TABLET, FILM COATED ORAL at 22:30

## 2024-02-04 RX ADMIN — BUDESONIDE AND FORMOTEROL FUMARATE DIHYDRATE 2 PUFF(S): 160; 4.5 AEROSOL RESPIRATORY (INHALATION) at 05:36

## 2024-02-04 RX ADMIN — SENNA PLUS 2 TABLET(S): 8.6 TABLET ORAL at 22:30

## 2024-02-04 RX ADMIN — CHLORHEXIDINE GLUCONATE 1 APPLICATION(S): 213 SOLUTION TOPICAL at 13:20

## 2024-02-04 NOTE — PROGRESS NOTE ADULT - ASSESSMENT
Patient is a 89 year old female with PMHx of COPD, Current Smoker, Endometriosis, Glaucoma HLD, HTN and OA. Presents to [facility] sp fall. Found to have multiple new thoracic compression fx.    Plan:    # Thoracic Compression Fx 2/2 Fall:  - CT A/P with acute fx R inf pubic rami, T5/T11 and 12 acute on chronic VCF w minimal retropulsion  - CT T/L Spine with acute-appearing moderate T11 and T12 compression fractures. Age-indeterminate mild T5, T9, moderate L1 and progression of moderate L2 compression fractures. Correlate for point tenderness.  - WBAT with TLSO brace for comfort  - Pain mgmt  - PT/OT  - Ortho following    # Breast Lesion:  - 3.2cm breast lesion on CT Chest  - s/p lumpectomy and RT in 2014   - Heme following    # COPD:  - C/w inhalers  - Monitor O2    # HLD/HTN:  - C/w CV meds    # Vitamin B12 Deficiency:  - C/w PO B12    # DVT ppx:  - Lovenox    DC planning    [facility]  [phone]

## 2024-02-04 NOTE — PROGRESS NOTE ADULT - SUBJECTIVE AND OBJECTIVE BOX
SUBJECTIVE / OVERNIGHT EVENTS:     No events noted overnight.       --------------------------------------------------------------------------------------------  LABS:    02-03    143  |  103  |  13  ----------------------------<  81  4.1   |  31  |  0.60    Ca    9.0      03 Feb 2024 06:45        CAPILLARY BLOOD GLUCOSE            Urinalysis Basic - ( 03 Feb 2024 06:45 )    Color: x / Appearance: x / SG: x / pH: x  Gluc: 81 mg/dL / Ketone: x  / Bili: x / Urobili: x   Blood: x / Protein: x / Nitrite: x   Leuk Esterase: x / RBC: x / WBC x   Sq Epi: x / Non Sq Epi: x / Bacteria: x        RADIOLOGY & ADDITIONAL TESTS:     Imaging Personally Reviewed:  [x] YES  [ ] NO    Consultant(s) Notes Reviewed:  [x] YES  [ ] NO    MEDICATIONS  (STANDING):  atorvastatin 40 milliGRAM(s) Oral at bedtime  budesonide 160 MICROgram(s)/formoterol 4.5 MICROgram(s) Inhaler 2 Puff(s) Inhalation two times a day  chlorhexidine 2% Cloths 1 Application(s) Topical <User Schedule>  diltiazem    milliGRAM(s) Oral daily  DULoxetine 30 milliGRAM(s) Oral daily  enoxaparin Injectable 40 milliGRAM(s) SubCutaneous every 24 hours  gabapentin 600 milliGRAM(s) Oral at bedtime  polyethylene glycol 3350 17 Gram(s) Oral daily  senna 2 Tablet(s) Oral at bedtime    MEDICATIONS  (PRN):  oxycodone    5 mG/acetaminophen 325 mG 2 Tablet(s) Oral every 4 hours PRN mod to severe pain      Care Discussed with Consultants/Other Providers [x] YES  [ ] NO    Vital Signs Last 24 Hrs  T(C): 36.8 (04 Feb 2024 05:06), Max: 36.9 (03 Feb 2024 11:15)  T(F): 98.2 (04 Feb 2024 05:06), Max: 98.5 (03 Feb 2024 11:15)  HR: 79 (04 Feb 2024 05:06) (74 - 80)  BP: 138/69 (04 Feb 2024 05:06) (106/62 - 138/69)  BP(mean): --  RR: 18 (04 Feb 2024 05:06) (18 - 18)  SpO2: 95% (04 Feb 2024 05:06) (94% - 95%)    Parameters below as of 04 Feb 2024 05:06  Patient On (Oxygen Delivery Method): room air      I&O's Summary      PHYSICAL EXAM:  GENERAL: NAD, well-developed, comfortable, frail  HEAD:  Atraumatic, Normocephalic  EYES: EOMI, PERRLA, conjunctiva and sclera clear  NECK: Supple, No JVD  CHEST/LUNG: Clear to auscultation bilaterally; No wheeze  HEART: Regular rate and rhythm; No murmurs, rubs, or gallops  ABDOMEN: Soft, Nontender, Nondistended; Bowel sounds present  NEURO: AAOx2/3, no focal weakness, 5/5 b/l extremity strength, b/l knee no arthritis, no effusion   EXTREMITIES:  2+ Peripheral Pulses, No clubbing, cyanosis, or edema  SKIN: No rashes or lesions

## 2024-02-04 NOTE — PROGRESS NOTE ADULT - SUBJECTIVE AND OBJECTIVE BOX
[facility] HEMATOLOGY/ON[facility]Y INPATIENT PROGRESS NOTE     Interval Hx:   02-04-24: Ms. Arguello was seen at bedside today.    Meds:   MEDICATIONS  (STANDING):  atorvastatin 40 milliGRAM(s) Oral at bedtime  budesonide 160 MICROgram(s)/formoterol 4.5 MICROgram(s) Inhaler 2 Puff(s) Inhalation two times a day  chlorhexidine 2% Cloths 1 Application(s) Topical <User Schedule>  diltiazem    milliGRAM(s) Oral daily  DULoxetine 30 milliGRAM(s) Oral daily  enoxaparin Injectable 40 milliGRAM(s) SubCutaneous every 24 hours  gabapentin 600 milliGRAM(s) Oral at bedtime  polyethylene glycol 3350 17 Gram(s) Oral daily  senna 2 Tablet(s) Oral at bedtime    MEDICATIONS  (PRN):  oxycodone    5 mG/acetaminophen 325 mG 2 Tablet(s) Oral every 4 hours PRN mod to severe pain    Vital Signs Last 24 Hrs  T(C): 36.8 (04 Feb 2024 05:06), Max: 36.9 (03 Feb 2024 11:15)  T(F): 98.2 (04 Feb 2024 05:06), Max: 98.5 (03 Feb 2024 11:15)  HR: 79 (04 Feb 2024 05:06) (74 - 80)  BP: 138/69 (04 Feb 2024 05:06) (106/62 - 138/69)  BP(mean): --  RR: 18 (04 Feb 2024 05:06) (18 - 18)  SpO2: 95% (04 Feb 2024 05:06) (94% - 95%)    Parameters below as of 04 Feb 2024 05:06  Patient On (Oxygen Delivery Method): room air    Physical Exam:  Gen: NAD, thin and frail   HEENT: EOMI, MMM  Chest: Equal chest rise, speaks full sentences   Breast exam: R breast with Inferior right quadrant 6cm firm mass   Cardiac: RR  Abd: Nondistended  Ext: No edema   Neuro: AAOX2, pleasant mood and affect    Labs:                        11.7   5.09  )-----------( 228      ( 02 Feb 2024 07:02 )             36.9     CBC Full  -  ( 02 Feb 2024 07:02 )  WBC Count : 5.09 K/uL  RBC Count : 3.65 M/uL  Hemoglobin : 11.7 g/dL  Hematocrit : 36.9 %  Platelet Count - Automated : 228 K/uL  Mean Cell Volume : 101.1 fl  Mean Cell Hemoglobin : 32.1 pg  Mean Cell Hemoglobin Concentration : 31.7 gm/dL  Auto Neutrophil # : x  Auto Lymphocyte # : x  Auto Monocyte # : x  Auto Eosinophil # : x  Auto Basophil # : x  Auto Neutrophil % : x  Auto Lymphocyte % : x  Auto Monocyte % : x  Auto Eosinophil % : x  Auto Basophil % : x    02-03    143  |  103  |  13  ----------------------------<  81  4.1   |  31  |  0.60    Ca    9.0      03 Feb 2024 06:45         [facility]AT[facility] INPATIENT PROGRESS NOTE     Interval Hx:   02-04-24: Ms. Arguello was seen at bedside today, no overnight events noted, awake and alert this morning no acute complaints     Meds:   MEDICATIONS  (STANDING):  atorvastatin 40 milliGRAM(s) Oral at bedtime  budesonide 160 MICROgram(s)/formoterol 4.5 MICROgram(s) Inhaler 2 Puff(s) Inhalation two times a day  chlorhexidine 2% Cloths 1 Application(s) Topical <User Schedule>  diltiazem    milliGRAM(s) Oral daily  DULoxetine 30 milliGRAM(s) Oral daily  enoxaparin Injectable 40 milliGRAM(s) SubCutaneous every 24 hours  gabapentin 600 milliGRAM(s) Oral at bedtime  polyethylene glycol 3350 17 Gram(s) Oral daily  senna 2 Tablet(s) Oral at bedtime    MEDICATIONS  (PRN):  oxycodone    5 mG/acetaminophen 325 mG 2 Tablet(s) Oral every 4 hours PRN mod to severe pain    Vital Signs Last 24 Hrs  T(C): 36.8 (04 Feb 2024 05:06), Max: 36.9 (03 Feb 2024 11:15)  T(F): 98.2 (04 Feb 2024 05:06), Max: 98.5 (03 Feb 2024 11:15)  HR: 79 (04 Feb 2024 05:06) (74 - 80)  BP: 138/69 (04 Feb 2024 05:06) (106/62 - 138/69)  BP(mean): --  RR: 18 (04 Feb 2024 05:06) (18 - 18)  SpO2: 95% (04 Feb 2024 05:06) (94% - 95%)    Parameters below as of 04 Feb 2024 05:06  Patient On (Oxygen Delivery Method): room air    Physical Exam:  Gen: NAD, thin and frail   HEENT: EOMI, MMM  Chest: Equal chest rise, speaks full sentences   Breast exam: R breast with Inferior right quadrant 6cm firm mass   Cardiac: RR  Abd: Nondistended  Ext: No edema   Neuro: AAOX2, pleasant mood and affect    Labs:                        11.7   5.09  )-----------( 228      ( 02 Feb 2024 07:02 )             36.9     CBC Full  -  ( 02 Feb 2024 07:02 )  WBC Count : 5.09 K/uL  RBC Count : 3.65 M/uL  Hemoglobin : 11.7 g/dL  Hematocrit : 36.9 %  Platelet Count - Automated : 228 K/uL  Mean Cell Volume : 101.1 fl  Mean Cell Hemoglobin : 32.1 pg  Mean Cell Hemoglobin Concentration : 31.7 gm/dL    02-03    143  |  103  |  13  ----------------------------<  81  4.1   |  31  |  0.60    Ca    9.0      03 Feb 2024 06:45

## 2024-02-04 NOTE — PROGRESS NOTE ADULT - ASSESSMENT
Ms. Arguello is an 89y Female with PMHx of DCIS s/p lumpectomy and RT in 2014, severe COPD, actively smoking, depression, HTN, LBBB, macular degeneration, osteopenia, chronic pain syndrome, lumbar radiculopathy, abdominal hernia, B12 deficiency, LE edema, dyslipidemia who is now admitted s/p mechanical fall and AMS and now found to have acute R inferior pubic rami fracture and T5/T11 and 12 acute on chronic vertebral compression fracture without acute intracranial disease. Orthopedic surgery was consulted, no surgical intervention planned at this time.     Patient last had a mammogram 2021 which was normal. she has a hx of DCIS s/p lumpectomy and RT, records need to be reviewed. Outpatient goals seem to be conservative management given age, as all subsequent screening modalities have been discontinues. CT Chest here now shows a cystic lesion in the right breast measures up to 3.2 cm without obvious LAD otherwise.  Labs here stable, CBC without anemia, normal renal and liver function, coags wnl. US Breast canceled given goals of care     # R Breast cystic lesion  - 3.2cm breast lesion on CT Chest  - R breast Inferior right quadrant 6cm firm mass on exam   - Hx of R breast DCIS vs malignancy? see below   - Can work up per pt and family preference   - Can then be followed up outpatient if needed and discuss goals of care at any time     # Hx of R breast cancer  - Unclear if DCIS vs invasive disease  - Will obtain outpatient records and review  - Pt s/p lumpectomy and RT in 2014     # Vit B12 deficiency   - Continue PO B12     # Lymphopenia  - Likely reactive vs chronic  - Can continue to monitor     Thank you for allowing me to participate in the care of Ms. Arguello please do not hesitate to call or text me if you have further questions or concerns.     Issa Lyon MD  [facility] [place]   Division of Hematology/Oncology  [address], [address]  [place] [address]  P: [phone]  F: [phone]    Attestation:    ----Pt evaluated including face-to-face interaction in addition to chart review, reviewing treatment plan, and managing the patient’s chronic diagnoses as listed in the assessment----

## 2024-02-05 ENCOUNTER — TRANSCRIPTION ENCOUNTER (OUTPATIENT)
Age: 89
End: 2024-02-05

## 2024-02-05 VITALS
SYSTOLIC BLOOD PRESSURE: 137 MMHG | HEART RATE: 72 BPM | TEMPERATURE: 98 F | DIASTOLIC BLOOD PRESSURE: 76 MMHG | OXYGEN SATURATION: 97 % | RESPIRATION RATE: 18 BRPM

## 2024-02-05 LAB — SARS-COV-2 RNA SPEC QL NAA+PROBE: SIGNIFICANT CHANGE UP

## 2024-02-05 PROCEDURE — 80053 COMPREHEN METABOLIC PANEL: CPT

## 2024-02-05 PROCEDURE — 85027 COMPLETE CBC AUTOMATED: CPT

## 2024-02-05 PROCEDURE — 72148 MRI LUMBAR SPINE W/O DYE: CPT | Mod: QQ

## 2024-02-05 PROCEDURE — 72190 X-RAY EXAM OF PELVIS: CPT

## 2024-02-05 PROCEDURE — 82803 BLOOD GASES ANY COMBINATION: CPT

## 2024-02-05 PROCEDURE — 85025 COMPLETE CBC W/AUTO DIFF WBC: CPT

## 2024-02-05 PROCEDURE — 83605 ASSAY OF LACTIC ACID: CPT

## 2024-02-05 PROCEDURE — 72125 CT NECK SPINE W/O DYE: CPT | Mod: MA

## 2024-02-05 PROCEDURE — 72170 X-RAY EXAM OF PELVIS: CPT

## 2024-02-05 PROCEDURE — 85730 THROMBOPLASTIN TIME PARTIAL: CPT

## 2024-02-05 PROCEDURE — 80307 DRUG TEST PRSMV CHEM ANLYZR: CPT

## 2024-02-05 PROCEDURE — 71260 CT THORAX DX C+: CPT | Mod: MG

## 2024-02-05 PROCEDURE — 97165 OT EVAL LOW COMPLEX 30 MIN: CPT

## 2024-02-05 PROCEDURE — 99285 EMERGENCY DEPT VISIT HI MDM: CPT | Mod: 25

## 2024-02-05 PROCEDURE — 87640 STAPH A DNA AMP PROBE: CPT

## 2024-02-05 PROCEDURE — 82550 ASSAY OF CK (CPK): CPT

## 2024-02-05 PROCEDURE — 81003 URINALYSIS AUTO W/O SCOPE: CPT

## 2024-02-05 PROCEDURE — 72146 MRI CHEST SPINE W/O DYE: CPT | Mod: QQ

## 2024-02-05 PROCEDURE — 97161 PT EVAL LOW COMPLEX 20 MIN: CPT

## 2024-02-05 PROCEDURE — 82962 GLUCOSE BLOOD TEST: CPT

## 2024-02-05 PROCEDURE — G1004: CPT

## 2024-02-05 PROCEDURE — 83690 ASSAY OF LIPASE: CPT

## 2024-02-05 PROCEDURE — 71045 X-RAY EXAM CHEST 1 VIEW: CPT

## 2024-02-05 PROCEDURE — 94640 AIRWAY INHALATION TREATMENT: CPT

## 2024-02-05 PROCEDURE — 85610 PROTHROMBIN TIME: CPT

## 2024-02-05 PROCEDURE — 87635 SARS-COV-2 COVID-19 AMP PRB: CPT

## 2024-02-05 PROCEDURE — 74177 CT ABD & PELVIS W/CONTRAST: CPT | Mod: MA

## 2024-02-05 PROCEDURE — 87641 MR-STAPH DNA AMP PROBE: CPT

## 2024-02-05 PROCEDURE — 96374 THER/PROPH/DIAG INJ IV PUSH: CPT

## 2024-02-05 PROCEDURE — 73552 X-RAY EXAM OF FEMUR 2/>: CPT

## 2024-02-05 PROCEDURE — 80048 BASIC METABOLIC PNL TOTAL CA: CPT

## 2024-02-05 PROCEDURE — 70450 CT HEAD/BRAIN W/O DYE: CPT | Mod: MA

## 2024-02-05 RX ORDER — SENNA PLUS 8.6 MG/1
2 TABLET ORAL
Qty: 0 | Refills: 0 | DISCHARGE
Start: 2024-02-05

## 2024-02-05 RX ORDER — DILTIAZEM HCL 120 MG
1 CAPSULE, EXT RELEASE 24 HR ORAL
Qty: 0 | Refills: 0 | DISCHARGE
Start: 2024-02-05

## 2024-02-05 RX ORDER — POLYETHYLENE GLYCOL 3350 17 G/17G
17 POWDER, FOR SOLUTION ORAL
Qty: 0 | Refills: 0 | DISCHARGE
Start: 2024-02-05

## 2024-02-05 RX ADMIN — CHLORHEXIDINE GLUCONATE 1 APPLICATION(S): 213 SOLUTION TOPICAL at 06:13

## 2024-02-05 RX ADMIN — DULOXETINE HYDROCHLORIDE 30 MILLIGRAM(S): 30 CAPSULE, DELAYED RELEASE ORAL at 12:05

## 2024-02-05 RX ADMIN — Medication 120 MILLIGRAM(S): at 06:13

## 2024-02-05 RX ADMIN — BUDESONIDE AND FORMOTEROL FUMARATE DIHYDRATE 2 PUFF(S): 160; 4.5 AEROSOL RESPIRATORY (INHALATION) at 06:14

## 2024-02-05 RX ADMIN — ENOXAPARIN SODIUM 40 MILLIGRAM(S): 100 INJECTION SUBCUTANEOUS at 06:13

## 2024-02-05 NOTE — DISCHARGE NOTE PROVIDER - CARE PROVIDERS DIRECT ADDRESSES
,DirectAddress_Unknown ,DirectAddress_Unknown,DirectAddress_Unknown,[email or web address]

## 2024-02-05 NOTE — DISCHARGE NOTE PROVIDER - NSDCMRMEDTOKEN_GEN_ALL_CORE_FT
Crestor 10 mg oral tablet: 1 tab(s) orally once a day (at bedtime)  Cymbalta 30 mg oral delayed release capsule: 1 cap(s) orally once a day  dilTIAZem 120 mg/24 hours oral capsule, extended release: 1 cap(s) orally once a day  gabapentin 600 mg oral tablet: 1 tab(s) orally once a day (at bedtime)  Percocet 10/325 oral tablet: 1 tab(s) orally 4 times a day as needed for pain  polyethylene glycol 3350 oral powder for reconstitution: 17 gram(s) orally once a day  senna leaf extract oral tablet: 2 tab(s) orally once a day (at bedtime)  Symbicort 160 mcg-4.5 mcg/inh inhalation aerosol: 2 puff(s) inhaled 2 times a day  TLSO brace: T5/T11 and 12 acute on chronic vertebral compression fracture  Ventolin HFA 90 mcg/inh inhalation aerosol: 2 puff(s) inhaled 4 times a day, As Needed

## 2024-02-05 NOTE — PROGRESS NOTE ADULT - PROVIDER SPECIALTY LIST ADULT
Heme/Onc
Internal Medicine
Internal Medicine
Orthopedics
Internal Medicine

## 2024-02-05 NOTE — DISCHARGE NOTE PROVIDER - HOSPITAL COURSE
HPI:  89F c hx COPD, current smoker, pw fall, and back pain.    Pt moderately confused at time of interview, A&Ox1, trying to climb out of bed, but answering most questions appropriately.  Pt states 2 days ago, she was in her house, trying to take her jacket off a hook and fell backwards onto the ground and hitting her head on a chair. Pt states she was able to get up afterwards and sit on her bed. Pt states she then called her daughter.  Pt lives alone and the fall was unwitnessed. Pt currently reports back pain. Also states she had nausea, vomiting, and diarrhea at some point, but can't say when. Otherwise denies any other symptoms, other than that she had a very bad night as she was unable to sleep due to being in the hallway. (31 Jan 2024 05:40)    Hospital Course:      Important Medication Changes and Reason:    Active or Pending Issues Requiring Follow-up:    Advanced Directives:   [ ] Full code  [ ] DNR  [ ] Hospice    Discharge Diagnoses:         HPI:  89F c hx COPD, current smoker, pw fall, and back pain.    Pt moderately confused at time of interview, A&Ox1, trying to climb out of bed, but answering most questions appropriately.  Pt states 2 days ago, she was in her house, trying to take her jacket off a hook and fell backwards onto the ground and hitting her head on a chair. Pt states she was able to get up afterwards and sit on her bed. Pt states she then called her daughter.  Pt lives alone and the fall was unwitnessed. Pt currently reports back pain. Also states she had nausea, vomiting, and diarrhea at some point, but can't say when. Otherwise denies any other symptoms, other than that she had a very bad night as she was unable to sleep due to being in the hallway. (31 Jan 2024 05:40)    Hospital Course:  Patient is a 89 year old female with PMHx of COPD, Current Smoker, Endometriosis, Glaucoma HLD, HTN and OA. Presents to [facility] sp fall. Found to have multiple new thoracic compression fx. CT A/P with acute fx R inf pubic rami, T5/T11 and 12 acute on chronic VCF w minimal retropulsion  - CT T/L Spine with acute-appearing moderate T11 and T12 compression fractures. Age-indeterminate mild T5, T9, moderate L1 and progression of moderate L2 compression fractures. Correlate for point tenderness.As per ortho,  WBAT with TLSO brace for comfort, Pain mgmt, PT/OT. Incidental finding of 3.2 cm breast lesion on Ct Chest. S/P  lumpectomy and RT in 2014. hematology is following------        # Vitamin B12 Deficiency:  - C/w PO B12      Important Medication Changes and Reason:    Active or Pending Issues Requiring Follow-up:    Advanced Directives:   [ ] Full code  [ ] DNR  [ ] Hospice    Discharge Diagnoses:         HPI:  89F c hx COPD, current smoker, pw fall, and back pain.    Pt moderately confused at time of interview, A&Ox1, trying to climb out of bed, but answering most questions appropriately.  Pt states 2 days ago, she was in her house, trying to take her jacket off a hook and fell backwards onto the ground and hitting her head on a chair. Pt states she was able to get up afterwards and sit on her bed. Pt states she then called her daughter.  Pt lives alone and the fall was unwitnessed. Pt currently reports back pain. Also states she had nausea, vomiting, and diarrhea at some point, but can't say when. Otherwise denies any other symptoms, other than that she had a very bad night as she was unable to sleep due to being in the hallway. (31 Jan 2024 05:40)    Hospital Course:  Patient is a 89 year old female with PMHx of COPD, Current Smoker, Endometriosis, Glaucoma HLD, HTN and OA. Presents to [facility] sp fall. Found to have multiple new thoracic compression fx. CT A/P with acute fx R inf pubic rami, T5/T11 and 12 acute on chronic VCF w minimal retropulsion  - CT T/L Spine with acute-appearing moderate T11 and T12 compression fractures. Age-indeterminate mild T5, T9, moderate L1 and progression of moderate L2 compression fractures. Correlate for point tenderness.As per ortho,  WBAT with TLSO brace for comfort, Pain mgmt, PT/OT. Incidental finding of 3.2 cm breast lesion on Ct Chest. S/P  lumpectomy and RT in 2014. hematology is following, work up per patient and family preference. Clinically stable and cleared for discharge with out patient follow up with  ortho and hematology.      Important Medication Changes and Reason:    Active or Pending Issues Requiring Follow-up:  Ortho  Hematology  Advanced Directives:   [x ] Full code  [ ] DNR  [ ] Hospice    Discharge Diagnoses:  S/P fall  Vertebral compression Fx  R breast lesion         HPI:  89F c hx COPD, current smoker, pw fall, and back pain.    Pt moderately confused at time of interview, A&Ox1, trying to climb out of bed, but answering most questions appropriately.  Pt states 2 days ago, she was in her house, trying to take her jacket off a hook and fell backwards onto the ground and hitting her head on a chair. Pt states she was able to get up afterwards and sit on her bed. Pt states she then called her daughter.  Pt lives alone and the fall was unwitnessed. Pt currently reports back pain. Also states she had nausea, vomiting, and diarrhea at some point, but can't say when. Otherwise denies any other symptoms, other than that she had a very bad night as she was unable to sleep due to being in the hallway. (31 Jan 2024 05:40)    Hospital Course:  Patient is a 89 year old female with PMHx of COPD, Current Smoker, Endometriosis, Glaucoma HLD, HTN and OA. Presents to [facility] sp fall. Found to have multiple new thoracic compression fx. CT A/P with acute fx R inf pubic rami, T5/T11 and 12 acute on chronic VCF w minimal retropulsion  - CT T/L Spine with acute-appearing moderate T11 and T12 compression fractures. Age-indeterminate mild T5, T9, moderate L1 and progression of moderate L2 compression fractures. Correlate for point tenderness.As per ortho,  WBAT with TLSO brace for comfort, Pain mgmt, PT/OT. Incidental finding of 3.2 cm breast lesion on Ct Chest. S/P  lumpectomy and RT in 2014. hematology is following, work up per patient and family preference. Clinically stable and cleared for discharge with out patient follow up with  ortho and hematology.      Important Medication Changes and Reason:  Bowel regimen added for constipation  Diltiazem dose decreased 2/2 low blood pressures and fall risk    Active or Pending Issues Requiring Follow-up:  Ortho  Hematology  Advanced Directives:     [x ] Full code  [ ] DNR  [ ] Hospice    Discharge Diagnoses:  S/P fall  Vertebral compression Fx  R breast lesion         HPI:  89F c hx COPD, current smoker, pw fall, and back pain.    Pt moderately confused at time of interview, A&Ox1, trying to climb out of bed, but answering most questions appropriately.  Pt states 2 days ago, she was in her house, trying to take her jacket off a hook and fell backwards onto the ground and hitting her head on a chair. Pt states she was able to get up afterwards and sit on her bed. Pt states she then called her daughter.  Pt lives alone and the fall was unwitnessed. Pt currently reports back pain. Also states she had nausea, vomiting, and diarrhea at some point, but can't say when. Otherwise denies any other symptoms, other than that she had a very bad night as she was unable to sleep due to being in the hallway. (31 Jan 2024 05:40)    Hospital Course:  Patient is a 89 year old female with PMHx of COPD, Current Smoker, Endometriosis, Glaucoma HLD, HTN and OA. Presents to [facility] sp fall. Found to have multiple new thoracic compression fx. CT A/P with acute fx R inf pubic rami, T5/T11 and 12 acute on chronic VCF w minimal retropulsion  - CT T/L Spine with acute-appearing moderate T11 and T12 compression fractures. Age-indeterminate mild T5, T9, moderate L1 and progression of moderate L2 compression fractures. Correlate for point tenderness. As per ortho,  WBAT with TLSO brace for comfort, Pain mgmt, PT/OT. Incidental finding of 3.2 cm breast lesion on Ct Chest. S/P  lumpectomy and RT in 2014. hematology is following, work up per patient and family preference. Clinically stable and cleared for discharge with out patient follow up with  ortho and hematology.      Important Medication Changes and Reason:  Bowel regimen added for constipation  Diltiazem dose decreased 2/2 low blood pressures and fall risk    Active or Pending Issues Requiring Follow-up:  Ortho  Hematology  Advanced Directives:     [x ] Full code  [ ] DNR  [ ] Hospice    Discharge Diagnoses:  S/P fall  Vertebral compression Fx  R breast lesion

## 2024-02-05 NOTE — PROGRESS NOTE ADULT - REASON FOR ADMISSION
fall, back pain

## 2024-02-05 NOTE — DISCHARGE NOTE PROVIDER - PROVIDER TOKENS
PROVIDER:[TOKEN:[[id number]]] PROVIDER:[TOKEN:[[id number]]],FREE:[LAST:[silbo],PHONE:[(   )    -],FAX:[(   )    -]],PROVIDER:[TOKEN:[[id number]]]

## 2024-02-05 NOTE — DISCHARGE NOTE NURSING/CASE MANAGEMENT/SOCIAL WORK - PATIENT PORTAL LINK FT
You can access the FollowMyHealth Patient Portal offered by [facility] by registering at the following website: [email or web address]. By joining [email or web address]’s FollowMyHealth portal, you will also be able to view your health information using other applications (apps) compatible with our system.

## 2024-02-05 NOTE — DISCHARGE NOTE NURSING/CASE MANAGEMENT/SOCIAL WORK - NSDCFUADDAPPT_GEN_ALL_CORE_FT
APPTS ARE READY TO BE MADE: [x ] YES    Best Family or Patient Contact (if needed):    Additional Information about above appointments (if needed):    1:   2:   3:     Other comments or requests:   Patient is being discharged to rehab/hospice. Caregiver will arrange follow up.

## 2024-02-05 NOTE — DISCHARGE NOTE PROVIDER - NSDCCPCAREPLAN_GEN_ALL_CORE_FT
PRINCIPAL DISCHARGE DIAGNOSIS  Diagnosis: Vertebral compression fracture  Assessment and Plan of Treatment: S/p fall.Found to have multiple new thoracic compression fx. CT A/P with acute fx R inf pubic rami, T5/T11 and 12 acute on chronic VCF w minimal retropulsion  - CT T/L Spine with acute-appearing moderate T11 and T12 compression fractures. Age-indeterminate mild T5, T9, moderate L1 and progression of moderate L2 compression fractures. As per ortho,  WBAT with TLSO brace for comfort, Pain mgmt, PT/OT. Incidental finding of 3.2 cm breast lesion on Ct Chest. S/P  lumpectomy and RT in 2014. hematology is following------      SECONDARY DISCHARGE DIAGNOSES  Diagnosis: Fall  Assessment and Plan of Treatment:     Diagnosis: Breast lesion  Assessment and Plan of Treatment: Incidental finding of 3.2 cm breast lesion on Ct Chest. S/P  lumpectomy and RT in 2014. hematology is following------     PRINCIPAL DISCHARGE DIAGNOSIS  Diagnosis: Vertebral compression fracture  Assessment and Plan of Treatment: S/p fall.Found to have multiple new thoracic compression fx. CT A/P with acute fx R inf pubic rami, T5/T11 and 12 acute on chronic VCF w minimal retropulsion  - CT T/L Spine with acute-appearing moderate T11 and T12 compression fractures. Age-indeterminate mild T5, T9, moderate L1 and progression of moderate L2 compression fractures. As per ortho,  WBAT with TLSO brace for comfort, Pain mgmt, PT/OT. To follow up with Dr Saenz and Dr Churchill in 1-2 weeks.      SECONDARY DISCHARGE DIAGNOSES  Diagnosis: Fall  Assessment and Plan of Treatment: Mechanical fall. Fall precautions, PT recommended rehab.    Diagnosis: Breast lesion  Assessment and Plan of Treatment: Incidental finding of 3.2 cm breast lesion on Ct Chest. S/P  lumpectomy and RT in 2014. To follow up with out patient oncologist

## 2024-02-05 NOTE — DISCHARGE NOTE PROVIDER - NSDCFUADDAPPT_GEN_ALL_CORE_FT
APPTS ARE READY TO BE MADE: [x ] YES    Best Family or Patient Contact (if needed):    Additional Information about above appointments (if needed):    1:   2:   3:     Other comments or requests:    APPTS ARE READY TO BE MADE: [x ] YES    Best Family or Patient Contact (if needed):    Additional Information about above appointments (if needed):    1:   2:   3:     Other comments or requests:   Patient is being discharged to rehab/hospice. Caregiver will arrange follow up.

## 2024-02-05 NOTE — DISCHARGE NOTE PROVIDER - CARE PROVIDER_API CALL
Issa Lyon  [place] [id number], [address]  [place] [address]  Phone: [phone]  Fax: [phone]  Follow Up Time:    Issa Lyon  [facility]  [address], [address]  [place] [address]  Phone: [phone]  Fax: [phone]  Follow Up Time:     melina,   Phone: (   )    -  Fax: (   )    -  Follow Up Time:     Onesimo Churchill  Orthopaedic Surgery  [address] [place] [address] [address]  [place] [address]  Phone: [phone]  Fax: [phone]  Follow Up Time:

## 2024-02-05 NOTE — PROGRESS NOTE ADULT - ASSESSMENT
Ms. Arguello is an 89y Female with PMHx of DCIS s/p lumpectomy and RT in 2014, severe COPD, actively smoking, depression, HTN, LBBB, macular degeneration, osteopenia, chronic pain syndrome, lumbar radiculopathy, abdominal hernia, B12 deficiency, LE edema, dyslipidemia who is now admitted s/p mechanical fall and AMS and now found to have acute R inferior pubic rami fracture and T5/T11 and 12 acute on chronic vertebral compression fracture without acute intracranial disease. Orthopedic surgery was consulted, no surgical intervention planned at this time.     Patient last had a mammogram 2021 which was normal. she has a hx of DCIS s/p lumpectomy and RT, records need to be reviewed. Outpatient goals seem to be conservative management given age, as all subsequent screening modalities have been discontinues. CT Chest here now shows a cystic lesion in the right breast measures up to 3.2 cm without obvious LAD otherwise.  Labs here stable, CBC without anemia, normal renal and liver function, coags wnl. US Breast canceled given goals of care     # R Breast cystic lesion  - 3.2cm breast lesion on CT Chest  - R breast Inferior right quadrant 6cm firm mass on exam   - Hx of R breast DCIS vs malignancy? see below   - Can work up per pt and family preference   - Can then be followed up outpatient if needed and discuss goals of care at any time     # Hx of R breast cancer  - Unclear if DCIS vs invasive disease  - Will obtain outpatient records and review  - Pt s/p lumpectomy and RT in 2014     # Vit B12 deficiency   - Continue PO B12     # Lymphopenia  - Likely reactive vs chronic  - Can continue to monitor     Thank you for allowing me to participate in the care of Ms. Arguello please do not hesitate to call or text me if you have further questions or concerns.     Issa Lyon MD  [facility] [place]   Division of Hematology/Oncology  [address], [address]  [place] [address]  P: [phone]  F: [phone]    Attestation:    ----Pt evaluated including face-to-face interaction in addition to chart review, reviewing treatment plan, and managing the patient’s chronic diagnoses as listed in the assessment----

## 2024-02-05 NOTE — PROGRESS NOTE ADULT - SUBJECTIVE AND OBJECTIVE BOX
SUBJECTIVE / OVERNIGHT EVENTS:    No events noted overnight. Resting in bed      --------------------------------------------------------------------------------------------  LABS:            CAPILLARY BLOOD GLUCOSE      POCT Blood Glucose.: 124 mg/dL (04 Feb 2024 22:07)            RADIOLOGY & ADDITIONAL TESTS:    Imaging Personally Reviewed:  [x] YES  [ ] NO    Consultant(s) Notes Reviewed:  [x] YES  [ ] NO    MEDICATIONS  (STANDING):  atorvastatin 40 milliGRAM(s) Oral at bedtime  budesonide 160 MICROgram(s)/formoterol 4.5 MICROgram(s) Inhaler 2 Puff(s) Inhalation two times a day  chlorhexidine 2% Cloths 1 Application(s) Topical <User Schedule>  diltiazem    milliGRAM(s) Oral daily  DULoxetine 30 milliGRAM(s) Oral daily  enoxaparin Injectable 40 milliGRAM(s) SubCutaneous every 24 hours  gabapentin 600 milliGRAM(s) Oral at bedtime  polyethylene glycol 3350 17 Gram(s) Oral daily  senna 2 Tablet(s) Oral at bedtime    MEDICATIONS  (PRN):  oxycodone    5 mG/acetaminophen 325 mG 2 Tablet(s) Oral every 4 hours PRN mod to severe pain      Care Discussed with Consultants/Other Providers [x] YES  [ ] NO    Vital Signs Last 24 Hrs  T(C): 36.7 (05 Feb 2024 04:39), Max: 37.2 (04 Feb 2024 20:07)  T(F): 98.1 (05 Feb 2024 04:39), Max: 99 (04 Feb 2024 20:07)  HR: 96 (05 Feb 2024 04:39) (75 - 96)  BP: 152/84 (05 Feb 2024 04:39) (102/54 - 152/84)  BP(mean): --  RR: 18 (05 Feb 2024 04:39) (18 - 18)  SpO2: 97% (05 Feb 2024 04:39) (96% - 97%)    Parameters below as of 05 Feb 2024 04:39  Patient On (Oxygen Delivery Method): room air      I&O's Summary    04 Feb 2024 07:01  -  05 Feb 2024 07:00  --------------------------------------------------------  IN: 240 mL / OUT: 0 mL / NET: 240 mL      PHYSICAL EXAM:  GENERAL: NAD, well-developed, comfortable, frail  HEAD:  Atraumatic, Normocephalic  EYES: EOMI, PERRLA, conjunctiva and sclera clear  NECK: Supple, No JVD  CHEST/LUNG: Clear to auscultation bilaterally; No wheeze  HEART: Regular rate and rhythm; No murmurs, rubs, or gallops  ABDOMEN: Soft, Nontender, Nondistended; Bowel sounds present  NEURO: AAOx2/3, no focal weakness, 5/5 b/l extremity strength, b/l knee no arthritis, no effusion   EXTREMITIES:  2+ Peripheral Pulses, No clubbing, cyanosis, or edema  SKIN: No rashes or lesions

## 2024-02-05 NOTE — PROGRESS NOTE ADULT - SUBJECTIVE AND OBJECTIVE BOX
[facility]EMATOLOGY/ONCOLOGY INPATIENT PROGRESS NOTE     Interval Hx:   02-05-24: Ms. Arguello was seen at bedside today.    Meds:   MEDICATIONS  (STANDING):  atorvastatin 40 milliGRAM(s) Oral at bedtime  budesonide 160 MICROgram(s)/formoterol 4.5 MICROgram(s) Inhaler 2 Puff(s) Inhalation two times a day  chlorhexidine 2% Cloths 1 Application(s) Topical <User Schedule>  diltiazem    milliGRAM(s) Oral daily  DULoxetine 30 milliGRAM(s) Oral daily  enoxaparin Injectable 40 milliGRAM(s) SubCutaneous every 24 hours  gabapentin 600 milliGRAM(s) Oral at bedtime  polyethylene glycol 3350 17 Gram(s) Oral daily  senna 2 Tablet(s) Oral at bedtime    MEDICATIONS  (PRN):  oxycodone    5 mG/acetaminophen 325 mG 2 Tablet(s) Oral every 4 hours PRN mod to severe pain    Vital Signs Last 24 Hrs  T(C): 37.2 (04 Feb 2024 20:07), Max: 37.2 (04 Feb 2024 20:07)  T(F): 99 (04 Feb 2024 20:07), Max: 99 (04 Feb 2024 20:07)  HR: 77 (04 Feb 2024 20:07) (75 - 79)  BP: 143/73 (04 Feb 2024 20:07) (102/54 - 143/73)  BP(mean): --  RR: 18 (04 Feb 2024 20:07) (18 - 18)  SpO2: 96% (04 Feb 2024 20:07) (95% - 96%)    Parameters below as of 04 Feb 2024 20:07  Patient On (Oxygen Delivery Method): room air    Physical Exam:  Gen: NAD, thin and frail   HEENT: EOMI, MMM  Chest: Equal chest rise, speaks full sentences   Breast exam: R breast with Inferior right quadrant 6cm firm mass   Cardiac: RR  Abd: Nondistended  Ext: No edema   Neuro: AAOX2, pleasant mood and affect    Labs:      02-03    143  |  103  |  13  ----------------------------<  81  4.1   |  31  |  0.60    Ca    9.0      03 Feb 2024 06:45         [facility] INPATIENT PROGRESS NOTE     Interval Hx:   02-05-24: Ms. Arguello was seen at bedside today, no overnight events, awake and alert, questions and concerns addressed     Meds:   MEDICATIONS  (STANDING):  atorvastatin 40 milliGRAM(s) Oral at bedtime  budesonide 160 MICROgram(s)/formoterol 4.5 MICROgram(s) Inhaler 2 Puff(s) Inhalation two times a day  chlorhexidine 2% Cloths 1 Application(s) Topical <User Schedule>  diltiazem    milliGRAM(s) Oral daily  DULoxetine 30 milliGRAM(s) Oral daily  enoxaparin Injectable 40 milliGRAM(s) SubCutaneous every 24 hours  gabapentin 600 milliGRAM(s) Oral at bedtime  polyethylene glycol 3350 17 Gram(s) Oral daily  senna 2 Tablet(s) Oral at bedtime    MEDICATIONS  (PRN):  oxycodone    5 mG/acetaminophen 325 mG 2 Tablet(s) Oral every 4 hours PRN mod to severe pain    Vital Signs Last 24 Hrs  T(C): 37.2 (04 Feb 2024 20:07), Max: 37.2 (04 Feb 2024 20:07)  T(F): 99 (04 Feb 2024 20:07), Max: 99 (04 Feb 2024 20:07)  HR: 77 (04 Feb 2024 20:07) (75 - 79)  BP: 143/73 (04 Feb 2024 20:07) (102/54 - 143/73)  BP(mean): --  RR: 18 (04 Feb 2024 20:07) (18 - 18)  SpO2: 96% (04 Feb 2024 20:07) (95% - 96%)    Parameters below as of 04 Feb 2024 20:07  Patient On (Oxygen Delivery Method): room air    Physical Exam:  Gen: NAD, thin and frail   HEENT: EOMI, MMM  Chest: Equal chest rise, speaks full sentences   Breast exam: R breast with Inferior right quadrant 6cm firm mass   Cardiac: RR  Abd: Nondistended  Ext: No edema   Neuro: AAOX2, pleasant mood and affect    Labs:      02-03    143  |  103  |  13  ----------------------------<  81  4.1   |  31  |  0.60    Ca    9.0      03 Feb 2024 06:45

## 2024-02-05 NOTE — PROGRESS NOTE ADULT - ASSESSMENT
Patient is a 89 year old female with PMHx of COPD, Current Smoker, Endometriosis, Glaucoma HLD, HTN and OA. Presents to [facility] sp fall. Found to have multiple new thoracic compression fx.    Plan:    # Thoracic Compression Fx 2/2 Fall:  - CT A/P with acute fx R inf pubic rami, T5/T11 and 12 acute on chronic VCF w minimal retropulsion  - CT T/L Spine with acute-appearing moderate T11 and T12 compression fractures. Age-indeterminate mild T5, T9, moderate L1 and progression of moderate L2 compression fractures. Correlate for point tenderness.  - WBAT with TLSO brace for comfort  - Pain mgmt  - PT/OT  - Ortho following    # Breast Lesion:  - 3.2cm breast lesion on CT Chest  - s/p lumpectomy and RT in 2014   - Heme following-> Can work up per pt and family preference     # COPD:  - C/w inhalers  - Monitor O2    # HLD/HTN:  - C/w CV meds    # Vitamin B12 Deficiency:  - C/w PO B12    # DVT ppx:  - Lovenox    DC planning    [facility]  [phone]

## 2024-06-10 NOTE — ED ADULT NURSE NOTE - NSHOSCREENINGQ1_ED_ALL_ED
Outreach attempt was made to schedule a Medicare Wellness Visit. This was the first attempt. Contact was not made, left message.    1st Attempt   
No

## 2024-10-30 NOTE — ED ADULT NURSE NOTE - NSFALLASSISTNEEDED_ED_ALL_ED
GOALS:   Continued/Maintain healthy weight loss with good nutrition intakes.  Adequate hydration with at least 64oz. fluid intake.  Normal vitamin and mineral levels.  Exercise as tolerated.  Diana Sapp in [place]    Follow-up in 3 months. We kindly ask that your arrive 15 minutes before your scheduled appointment time with your provider to allow our staff to room you, get your vital signs and update your chart.    Follow diet as discussed.      Get lab work done in the next 2 weeks.  You have been given a lab slip today.  Please call the office if you need a replacement.  It is recommended to check with your insurance BEFORE getting labs done to make sure they are covered by your policy.  Also, please check with your PCP and other providers before getting labs to avoid duplicate labs. Make sure to HOLD any multivitamins that may contain biotin and any biotin supplements FOR 5 DAYS before any labs since it can affect the results.    Follow vitamin and mineral recommendations as reviewed with you.    Call our office if you have any problems with abdominal pain especially associated with fever, chills, nausea, vomiting or any other concerns.    All  Post-bariatric surgery patients should be aware that very small quantities of any alcohol can cause impairment and it is very possible not to feel the effect. The effect can be in the system for several hours.  It is also a stomach irritant.     It is advised to AVOID alcohol, Nonsteroidal antiinflammatory drugs (NSAIDS) and nicotine of all forms . Any of these can cause stomach irritation/pain.   Standing/Walking/Toileting